# Patient Record
Sex: FEMALE | Race: WHITE | NOT HISPANIC OR LATINO
[De-identification: names, ages, dates, MRNs, and addresses within clinical notes are randomized per-mention and may not be internally consistent; named-entity substitution may affect disease eponyms.]

---

## 2020-05-20 ENCOUNTER — APPOINTMENT (OUTPATIENT)
Dept: OBGYN | Facility: CLINIC | Age: 35
End: 2020-05-20
Payer: MEDICAID

## 2020-05-20 VITALS — DIASTOLIC BLOOD PRESSURE: 80 MMHG | SYSTOLIC BLOOD PRESSURE: 120 MMHG | WEIGHT: 263 LBS

## 2020-05-20 DIAGNOSIS — Z82.79 FAMILY HISTORY OF OTHER CONGENITAL MALFORMATIONS, DEFORMATIONS AND CHROMOSOMAL ABNORMALITIES: ICD-10-CM

## 2020-05-20 DIAGNOSIS — Z91.09 OTHER ALLERGY STATUS, OTHER THAN TO DRUGS AND BIOLOGICAL SUBSTANCES: ICD-10-CM

## 2020-05-20 PROBLEM — Z00.00 ENCOUNTER FOR PREVENTIVE HEALTH EXAMINATION: Status: ACTIVE | Noted: 2020-05-20

## 2020-05-20 PROCEDURE — 36415 COLL VENOUS BLD VENIPUNCTURE: CPT

## 2020-05-20 PROCEDURE — 99203 OFFICE O/P NEW LOW 30 MIN: CPT | Mod: TH

## 2020-05-20 RX ORDER — ALBUTEROL SULFATE
POWDER (GRAM) MISCELLANEOUS
Refills: 0 | Status: ACTIVE | COMMUNITY

## 2020-05-20 NOTE — CHIEF COMPLAINT
[Initial Prenatal Visit] : initial prenatal visit [FreeTextEntry1] : Initial prenatal visit.\par LMP:11/8/2019\par SHEEBA;8/15/2020\par 27w5d

## 2020-05-20 NOTE — HISTORY OF PRESENT ILLNESS
[Definite:  ___ (Date)] : the last menstrual period was [unfilled] [Normal Amount/Duration] : was of a normal amount and duration [Frequency: Q ___ days] : menstrual periods occur approximately every [unfilled] days [Regular Cycle Intervals] : periods have been regular [Menarche Age: ____] : age at menarche was [unfilled] [Good] : being in good health [Healthy Diet] : a healthy diet [6 Months Ago] : 6 months ago [Regular Exercise] : regular exercise [Last Pap ___] : Last cervical pap smear was [unfilled] [Sexually Active] : is sexually active [Monogamous] : is monogamous [Male ___] : [unfilled] male [Yes] : yes [Weight Concerns] : no concerns with her weight [Spotting Between  Menses] : no spotting between menses [Menstrual Cramps] : no menstrual cramps [On BCP at conception] : the patient was not on BCP at conception [Contraception] : does not use contraception

## 2020-05-21 LAB
ABO + RH PNL BLD: NORMAL
BASOPHILS # BLD AUTO: 0.04 K/UL
BASOPHILS NFR BLD AUTO: 0.4 %
BLD GP AB SCN SERPL QL: NORMAL
C TRACH RRNA SPEC QL NAA+PROBE: NOT DETECTED
CMV IGG SERPL QL: <0.2 U/ML
CMV IGG SERPL-IMP: NEGATIVE
CMV IGM SERPL QL: <8 AU/ML
CMV IGM SERPL QL: NEGATIVE
EOSINOPHIL # BLD AUTO: 0.28 K/UL
EOSINOPHIL NFR BLD AUTO: 2.7 %
GLUCOSE 1H P 50 G GLC PO SERPL-MCNC: 108 MG/DL
HBV SURFACE AG SER QL: NONREACTIVE
HCT VFR BLD CALC: 38.4 %
HCV AB SER QL: NONREACTIVE
HCV S/CO RATIO: 0.13 S/CO
HGB BLD-MCNC: 11.7 G/DL
HIV1+2 AB SPEC QL IA.RAPID: NONREACTIVE
HSV 1+2 IGG SER IA-IMP: POSITIVE
HSV 1+2 IGG SER IA-IMP: POSITIVE
HSV1 IGG SER QL: 29.7 INDEX
HSV2 IGG SER QL: 2.75 INDEX
IMM GRANULOCYTES NFR BLD AUTO: 0.7 %
LYMPHOCYTES # BLD AUTO: 1.33 K/UL
LYMPHOCYTES NFR BLD AUTO: 12.6 %
MAN DIFF?: NORMAL
MCHC RBC-ENTMCNC: 30.5 GM/DL
MCHC RBC-ENTMCNC: 31.4 PG
MCV RBC AUTO: 102.9 FL
MEV IGG FLD QL IA: 186 AU/ML
MEV IGG+IGM SER-IMP: POSITIVE
MONOCYTES # BLD AUTO: 0.81 K/UL
MONOCYTES NFR BLD AUTO: 7.7 %
N GONORRHOEA RRNA SPEC QL NAA+PROBE: NOT DETECTED
NEUTROPHILS # BLD AUTO: 8 K/UL
NEUTROPHILS NFR BLD AUTO: 75.9 %
PLATELET # BLD AUTO: 156 K/UL
RBC # BLD: 3.73 M/UL
RBC # FLD: 13.9 %
RUBV IGG FLD-ACNC: 8.6 INDEX
RUBV IGG SER-IMP: POSITIVE
SOURCE AMPLIFICATION: NORMAL
T GONDII AB SER-IMP: NEGATIVE
T GONDII AB SER-IMP: NEGATIVE
T GONDII IGG SER QL: <3 IU/ML
T GONDII IGM SER QL: <3 AU/ML
T PALLIDUM AB SER QL IA: NEGATIVE
TSH SERPL-ACNC: 3.58 UIU/ML
VZV AB TITR SER: POSITIVE
VZV IGG SER IF-ACNC: 1187 INDEX
WBC # FLD AUTO: 10.53 K/UL

## 2020-05-21 RX ORDER — HUMAN RHO(D) IMMUNE GLOBULIN 300 UG/1
1500 INJECTION, SOLUTION INTRAMUSCULAR
Qty: 1 | Refills: 0 | Status: ACTIVE | COMMUNITY
Start: 2020-05-21 | End: 1900-01-01

## 2020-05-22 ENCOUNTER — APPOINTMENT (OUTPATIENT)
Dept: ANTEPARTUM | Facility: CLINIC | Age: 35
End: 2020-05-22
Payer: MEDICAID

## 2020-05-22 LAB
B19V IGG SER QL IA: 0.1 INDEX
B19V IGG+IGM SER-IMP: NEGATIVE
B19V IGG+IGM SER-IMP: NORMAL
B19V IGM FLD-ACNC: 0.3
B19V IGM SER-ACNC: NEGATIVE
BACTERIA UR CULT: NORMAL
HSV1 IGM SER QL: NORMAL TITER
HSV2 AB FLD-ACNC: NORMAL TITER

## 2020-05-22 PROCEDURE — 76811 OB US DETAILED SNGL FETUS: CPT

## 2020-05-22 PROCEDURE — 76817 TRANSVAGINAL US OBSTETRIC: CPT

## 2020-05-28 LAB
FMR1 GENE MUT ANL BLD/T: NORMAL
HGB A MFR BLD: 97.7 %
HGB A2 MFR BLD: 2.3 %
HGB FRACT BLD-IMP: NORMAL

## 2020-05-29 LAB
AR GENE MUT ANL BLD/T: NEGATIVE
CFTR MUT TESTED BLD/T: NEGATIVE

## 2020-06-03 ENCOUNTER — APPOINTMENT (OUTPATIENT)
Dept: OBGYN | Facility: CLINIC | Age: 35
End: 2020-06-03
Payer: MEDICAID

## 2020-06-03 VITALS
HEIGHT: 68 IN | DIASTOLIC BLOOD PRESSURE: 78 MMHG | BODY MASS INDEX: 40.92 KG/M2 | SYSTOLIC BLOOD PRESSURE: 124 MMHG | WEIGHT: 270 LBS

## 2020-06-03 PROCEDURE — 87081 CULTURE SCREEN ONLY: CPT

## 2020-06-03 PROCEDURE — 99213 OFFICE O/P EST LOW 20 MIN: CPT | Mod: TH

## 2020-06-09 ENCOUNTER — APPOINTMENT (OUTPATIENT)
Dept: ANTEPARTUM | Facility: CLINIC | Age: 35
End: 2020-06-09
Payer: MEDICAID

## 2020-06-09 PROCEDURE — 76815 OB US LIMITED FETUS(S): CPT

## 2020-06-09 PROCEDURE — 76819 FETAL BIOPHYS PROFIL W/O NST: CPT

## 2020-06-16 ENCOUNTER — LABORATORY RESULT (OUTPATIENT)
Age: 35
End: 2020-06-16

## 2020-06-16 ENCOUNTER — APPOINTMENT (OUTPATIENT)
Dept: OBGYN | Facility: CLINIC | Age: 35
End: 2020-06-16
Payer: MEDICAID

## 2020-06-16 VITALS
WEIGHT: 273 LBS | BODY MASS INDEX: 41.37 KG/M2 | SYSTOLIC BLOOD PRESSURE: 117 MMHG | DIASTOLIC BLOOD PRESSURE: 75 MMHG | HEIGHT: 68 IN

## 2020-06-16 DIAGNOSIS — Z67.91 OTHER SPECIFIED PREGNANCY RELATED CONDITIONS, UNSPECIFIED TRIMESTER: ICD-10-CM

## 2020-06-16 DIAGNOSIS — O26.899 OTHER SPECIFIED PREGNANCY RELATED CONDITIONS, UNSPECIFIED TRIMESTER: ICD-10-CM

## 2020-06-16 PROCEDURE — 99213 OFFICE O/P EST LOW 20 MIN: CPT | Mod: TH

## 2020-06-16 PROCEDURE — 36415 COLL VENOUS BLD VENIPUNCTURE: CPT

## 2020-06-17 LAB
ALBUMIN SERPL ELPH-MCNC: 3.7 G/DL
ALP BLD-CCNC: 88 U/L
ALT SERPL-CCNC: 11 U/L
ANION GAP SERPL CALC-SCNC: 14 MMOL/L
APPEARANCE: ABNORMAL
APTT BLD: 29 SEC
AST SERPL-CCNC: 16 U/L
BASOPHILS # BLD AUTO: 0.05 K/UL
BASOPHILS NFR BLD AUTO: 0.5 %
BILIRUB SERPL-MCNC: <0.2 MG/DL
BILIRUBIN URINE: NEGATIVE
BLOOD URINE: NEGATIVE
BUN SERPL-MCNC: 7 MG/DL
CALCIUM SERPL-MCNC: 8.9 MG/DL
CHLORIDE SERPL-SCNC: 107 MMOL/L
CO2 SERPL-SCNC: 18 MMOL/L
COLOR: YELLOW
CREAT SERPL-MCNC: 0.47 MG/DL
EOSINOPHIL # BLD AUTO: 0.31 K/UL
EOSINOPHIL NFR BLD AUTO: 2.8 %
GLUCOSE QUALITATIVE U: NEGATIVE
GLUCOSE SERPL-MCNC: 98 MG/DL
HCT VFR BLD CALC: 36.1 %
HGB BLD-MCNC: 11.7 G/DL
IMM GRANULOCYTES NFR BLD AUTO: 0.7 %
KETONES URINE: NEGATIVE
LEUKOCYTE ESTERASE URINE: ABNORMAL
LYMPHOCYTES # BLD AUTO: 1.4 K/UL
LYMPHOCYTES NFR BLD AUTO: 12.6 %
MAN DIFF?: NORMAL
MCHC RBC-ENTMCNC: 32.4 GM/DL
MCHC RBC-ENTMCNC: 32.4 PG
MCV RBC AUTO: 100 FL
MONOCYTES # BLD AUTO: 0.79 K/UL
MONOCYTES NFR BLD AUTO: 7.1 %
NEUTROPHILS # BLD AUTO: 8.47 K/UL
NEUTROPHILS NFR BLD AUTO: 76.3 %
NITRITE URINE: NEGATIVE
PH URINE: 6.5
PLATELET # BLD AUTO: 169 K/UL
POTASSIUM SERPL-SCNC: 3.9 MMOL/L
PROT SERPL-MCNC: 6.1 G/DL
PROTEIN URINE: ABNORMAL
RBC # BLD: 3.61 M/UL
RBC # FLD: 14.1 %
SODIUM SERPL-SCNC: 139 MMOL/L
SPECIFIC GRAVITY URINE: 1.02
URATE SERPL-MCNC: 3.1 MG/DL
UROBILINOGEN URINE: NORMAL
WBC # FLD AUTO: 11.1 K/UL

## 2020-06-26 LAB
CREAT 24H UR-MCNC: 1.2 G/24 H
CREAT 24H UR-MCNC: 1.2 G/24 H
CREAT ?TM UR-MCNC: 83 MG/DL
CREAT ?TM UR-MCNC: 83 MG/DL
PROT 24H UR-MRATE: 23 MG/DL
PROT ?TM UR-MCNC: 24 HR
PROT ?TM UR-MCNC: 24 HR
PROT UR-MCNC: 345 MG/24 H
SPECIMEN VOL 24H UR: 1500 ML
SPECIMEN VOL 24H UR: 1500 ML

## 2020-06-30 ENCOUNTER — LABORATORY RESULT (OUTPATIENT)
Age: 35
End: 2020-06-30

## 2020-06-30 ENCOUNTER — APPOINTMENT (OUTPATIENT)
Dept: OBGYN | Facility: CLINIC | Age: 35
End: 2020-06-30
Payer: MEDICAID

## 2020-06-30 ENCOUNTER — APPOINTMENT (OUTPATIENT)
Dept: ANTEPARTUM | Facility: CLINIC | Age: 35
End: 2020-06-30
Payer: MEDICAID

## 2020-06-30 VITALS
SYSTOLIC BLOOD PRESSURE: 120 MMHG | BODY MASS INDEX: 41.08 KG/M2 | DIASTOLIC BLOOD PRESSURE: 75 MMHG | WEIGHT: 271.06 LBS | HEIGHT: 68 IN

## 2020-06-30 DIAGNOSIS — Z34.90 ENCOUNTER FOR SUPERVISION OF NORMAL PREGNANCY, UNSPECIFIED, UNSPECIFIED TRIMESTER: ICD-10-CM

## 2020-06-30 PROCEDURE — 36415 COLL VENOUS BLD VENIPUNCTURE: CPT

## 2020-06-30 PROCEDURE — 99213 OFFICE O/P EST LOW 20 MIN: CPT | Mod: TH

## 2020-06-30 PROCEDURE — 76819 FETAL BIOPHYS PROFIL W/O NST: CPT

## 2020-06-30 PROCEDURE — 76816 OB US FOLLOW-UP PER FETUS: CPT

## 2020-07-06 LAB
ALBUMIN SERPL ELPH-MCNC: 4 G/DL
ALP BLD-CCNC: 106 U/L
ALT SERPL-CCNC: 9 U/L
ANION GAP SERPL CALC-SCNC: 12 MMOL/L
APPEARANCE: ABNORMAL
APTT BLD: 28 SEC
APTT IMM NP/PRE NP PPP: NORMAL
APTT INV RATIO PPP: 28 SEC
AST SERPL-CCNC: 11 U/L
BASOPHILS # BLD AUTO: 0.04 K/UL
BASOPHILS NFR BLD AUTO: 0.3 %
BILIRUB SERPL-MCNC: 0.2 MG/DL
BILIRUBIN URINE: NEGATIVE
BLOOD URINE: NEGATIVE
BUN SERPL-MCNC: 7 MG/DL
CALCIUM SERPL-MCNC: 9.4 MG/DL
CHLORIDE SERPL-SCNC: 108 MMOL/L
CO2 SERPL-SCNC: 21 MMOL/L
COLOR: YELLOW
CREAT SERPL-MCNC: 0.48 MG/DL
EOSINOPHIL # BLD AUTO: 0.21 K/UL
EOSINOPHIL NFR BLD AUTO: 1.8 %
GLUCOSE QUALITATIVE U: NEGATIVE
GLUCOSE SERPL-MCNC: 96 MG/DL
GP B STREP DNA SPEC QL NAA+PROBE: NORMAL
GP B STREP DNA SPEC QL NAA+PROBE: NOT DETECTED
HCT VFR BLD CALC: 38.2 %
HGB BLD-MCNC: 12 G/DL
IMM GRANULOCYTES NFR BLD AUTO: 0.8 %
KETONES URINE: NEGATIVE
LEUKOCYTE ESTERASE URINE: NEGATIVE
LYMPHOCYTES # BLD AUTO: 1.52 K/UL
LYMPHOCYTES NFR BLD AUTO: 13.1 %
MAN DIFF?: NORMAL
MCHC RBC-ENTMCNC: 31.4 GM/DL
MCHC RBC-ENTMCNC: 32.2 PG
MCV RBC AUTO: 102.4 FL
MONOCYTES # BLD AUTO: 0.83 K/UL
MONOCYTES NFR BLD AUTO: 7.1 %
NEUTROPHILS # BLD AUTO: 8.93 K/UL
NEUTROPHILS NFR BLD AUTO: 76.9 %
NITRITE URINE: NEGATIVE
NPP NORMAL POOLED PLASMA: NORMAL SECS
PH URINE: 6
PLATELET # BLD AUTO: 140 K/UL
POTASSIUM SERPL-SCNC: 4 MMOL/L
PROT SERPL-MCNC: 6 G/DL
PROTEIN URINE: ABNORMAL
RBC # BLD: 3.73 M/UL
RBC # FLD: 14.2 %
SODIUM SERPL-SCNC: 141 MMOL/L
SOURCE GBS: NORMAL
SPECIFIC GRAVITY URINE: 1.03
URATE SERPL-MCNC: 2.9 MG/DL
UROBILINOGEN URINE: NORMAL
WBC # FLD AUTO: 11.62 K/UL

## 2020-07-08 ENCOUNTER — APPOINTMENT (OUTPATIENT)
Dept: ANTEPARTUM | Facility: CLINIC | Age: 35
End: 2020-07-08
Payer: MEDICAID

## 2020-07-08 PROCEDURE — 76818 FETAL BIOPHYS PROFILE W/NST: CPT

## 2020-07-08 PROCEDURE — 76816 OB US FOLLOW-UP PER FETUS: CPT

## 2020-07-14 ENCOUNTER — APPOINTMENT (OUTPATIENT)
Dept: OBGYN | Facility: CLINIC | Age: 35
End: 2020-07-14
Payer: MEDICAID

## 2020-07-14 VITALS — WEIGHT: 272 LBS | BODY MASS INDEX: 41.22 KG/M2 | HEIGHT: 68 IN

## 2020-07-14 PROCEDURE — 99213 OFFICE O/P EST LOW 20 MIN: CPT | Mod: TH

## 2020-07-14 RX ORDER — VALACYCLOVIR 500 MG/1
500 TABLET, FILM COATED ORAL
Qty: 30 | Refills: 1 | Status: ACTIVE | COMMUNITY
Start: 2020-07-14 | End: 1900-01-01

## 2020-07-15 ENCOUNTER — APPOINTMENT (OUTPATIENT)
Dept: ANTEPARTUM | Facility: CLINIC | Age: 35
End: 2020-07-15
Payer: MEDICAID

## 2020-07-15 PROCEDURE — 76818 FETAL BIOPHYS PROFILE W/NST: CPT

## 2020-07-15 PROCEDURE — 76816 OB US FOLLOW-UP PER FETUS: CPT

## 2020-07-21 ENCOUNTER — APPOINTMENT (OUTPATIENT)
Dept: ANTEPARTUM | Facility: CLINIC | Age: 35
End: 2020-07-21
Payer: MEDICAID

## 2020-07-21 ENCOUNTER — NON-APPOINTMENT (OUTPATIENT)
Age: 35
End: 2020-07-21

## 2020-07-21 ENCOUNTER — APPOINTMENT (OUTPATIENT)
Dept: OBGYN | Facility: CLINIC | Age: 35
End: 2020-07-21
Payer: MEDICAID

## 2020-07-21 VITALS
BODY MASS INDEX: 42.55 KG/M2 | WEIGHT: 280.75 LBS | HEIGHT: 68 IN | SYSTOLIC BLOOD PRESSURE: 130 MMHG | DIASTOLIC BLOOD PRESSURE: 60 MMHG

## 2020-07-21 PROCEDURE — 76818 FETAL BIOPHYS PROFILE W/NST: CPT

## 2020-07-21 PROCEDURE — 99213 OFFICE O/P EST LOW 20 MIN: CPT | Mod: TH

## 2020-07-21 PROCEDURE — 76816 OB US FOLLOW-UP PER FETUS: CPT

## 2020-07-24 ENCOUNTER — APPOINTMENT (OUTPATIENT)
Dept: OBGYN | Facility: CLINIC | Age: 35
End: 2020-07-24
Payer: MEDICAID

## 2020-07-24 ENCOUNTER — APPOINTMENT (OUTPATIENT)
Dept: ANTEPARTUM | Facility: CLINIC | Age: 35
End: 2020-07-24
Payer: MEDICAID

## 2020-07-24 DIAGNOSIS — O14.93 UNSPECIFIED PRE-ECLAMPSIA, THIRD TRIMESTER: ICD-10-CM

## 2020-07-24 DIAGNOSIS — O09.519 SUPERVISION OF ELDERLY PRIMIGRAVIDA, UNSPECIFIED TRIMESTER: ICD-10-CM

## 2020-07-24 PROCEDURE — 76819 FETAL BIOPHYS PROFIL W/O NST: CPT

## 2020-07-24 PROCEDURE — 59025 FETAL NON-STRESS TEST: CPT | Mod: 59

## 2020-07-24 PROCEDURE — 99213 OFFICE O/P EST LOW 20 MIN: CPT | Mod: TH,25

## 2020-07-25 ENCOUNTER — LABORATORY RESULT (OUTPATIENT)
Age: 35
End: 2020-07-25

## 2020-07-27 ENCOUNTER — INPATIENT (INPATIENT)
Facility: HOSPITAL | Age: 35
LOS: 3 days | Discharge: HOME | End: 2020-07-31
Attending: OBSTETRICS & GYNECOLOGY | Admitting: OBSTETRICS & GYNECOLOGY
Payer: MEDICAID

## 2020-07-27 VITALS — WEIGHT: 273.37 LBS | HEIGHT: 69 IN

## 2020-07-27 DIAGNOSIS — O26.899 OTHER SPECIFIED PREGNANCY RELATED CONDITIONS, UNSPECIFIED TRIMESTER: ICD-10-CM

## 2020-07-27 DIAGNOSIS — Z3A.00 WEEKS OF GESTATION OF PREGNANCY NOT SPECIFIED: ICD-10-CM

## 2020-07-27 LAB
ALBUMIN SERPL ELPH-MCNC: 3.5 G/DL — SIGNIFICANT CHANGE UP (ref 3.3–5)
ALP SERPL-CCNC: 131 U/L — HIGH (ref 40–120)
ALT FLD-CCNC: 7 U/L — LOW (ref 10–45)
ANION GAP SERPL CALC-SCNC: 12 MMOL/L — SIGNIFICANT CHANGE UP (ref 5–17)
APPEARANCE UR: CLEAR — SIGNIFICANT CHANGE UP
APTT BLD: 28 SEC — SIGNIFICANT CHANGE UP (ref 27.5–35.5)
AST SERPL-CCNC: 20 U/L — SIGNIFICANT CHANGE UP (ref 10–40)
BASOPHILS # BLD AUTO: 0.03 K/UL — SIGNIFICANT CHANGE UP (ref 0–0.2)
BASOPHILS NFR BLD AUTO: 0.2 % — SIGNIFICANT CHANGE UP (ref 0–2)
BILIRUB SERPL-MCNC: 0.2 MG/DL — SIGNIFICANT CHANGE UP (ref 0.2–1.2)
BILIRUB UR-MCNC: NEGATIVE — SIGNIFICANT CHANGE UP
BLD GP AB SCN SERPL QL: POSITIVE — SIGNIFICANT CHANGE UP
BUN SERPL-MCNC: 10 MG/DL — SIGNIFICANT CHANGE UP (ref 7–23)
CALCIUM SERPL-MCNC: 9.2 MG/DL — SIGNIFICANT CHANGE UP (ref 8.4–10.5)
CHLORIDE SERPL-SCNC: 102 MMOL/L — SIGNIFICANT CHANGE UP (ref 96–108)
CO2 SERPL-SCNC: 23 MMOL/L — SIGNIFICANT CHANGE UP (ref 22–31)
COLOR SPEC: YELLOW — SIGNIFICANT CHANGE UP
CREAT ?TM UR-MCNC: 215 MG/DL — SIGNIFICANT CHANGE UP
CREAT SERPL-MCNC: 0.48 MG/DL — LOW (ref 0.5–1.3)
DIFF PNL FLD: NEGATIVE — SIGNIFICANT CHANGE UP
EOSINOPHIL # BLD AUTO: 0.33 K/UL — SIGNIFICANT CHANGE UP (ref 0–0.5)
EOSINOPHIL NFR BLD AUTO: 2.3 % — SIGNIFICANT CHANGE UP (ref 0–6)
FIBRINOGEN PPP-MCNC: 587 MG/DL — HIGH (ref 258–438)
GLUCOSE SERPL-MCNC: 119 MG/DL — HIGH (ref 70–99)
GLUCOSE UR QL: NEGATIVE — SIGNIFICANT CHANGE UP
HCT VFR BLD CALC: 35.3 % — SIGNIFICANT CHANGE UP (ref 34.5–45)
HGB BLD-MCNC: 11.8 G/DL — SIGNIFICANT CHANGE UP (ref 11.5–15.5)
IMM GRANULOCYTES NFR BLD AUTO: 0.8 % — SIGNIFICANT CHANGE UP (ref 0–1.5)
INR BLD: 0.9 — SIGNIFICANT CHANGE UP (ref 0.88–1.16)
KETONES UR-MCNC: ABNORMAL MG/DL
LDH SERPL L TO P-CCNC: 174 U/L — SIGNIFICANT CHANGE UP (ref 50–242)
LEUKOCYTE ESTERASE UR-ACNC: NEGATIVE — SIGNIFICANT CHANGE UP
LYMPHOCYTES # BLD AUTO: 1.84 K/UL — SIGNIFICANT CHANGE UP (ref 1–3.3)
LYMPHOCYTES # BLD AUTO: 12.8 % — LOW (ref 13–44)
MCHC RBC-ENTMCNC: 32 PG — SIGNIFICANT CHANGE UP (ref 27–34)
MCHC RBC-ENTMCNC: 33.4 GM/DL — SIGNIFICANT CHANGE UP (ref 32–36)
MCV RBC AUTO: 95.7 FL — SIGNIFICANT CHANGE UP (ref 80–100)
MONOCYTES # BLD AUTO: 1.11 K/UL — HIGH (ref 0–0.9)
MONOCYTES NFR BLD AUTO: 7.7 % — SIGNIFICANT CHANGE UP (ref 2–14)
NEUTROPHILS # BLD AUTO: 10.92 K/UL — HIGH (ref 1.8–7.4)
NEUTROPHILS NFR BLD AUTO: 76.2 % — SIGNIFICANT CHANGE UP (ref 43–77)
NITRITE UR-MCNC: NEGATIVE — SIGNIFICANT CHANGE UP
NRBC # BLD: 0 /100 WBCS — SIGNIFICANT CHANGE UP (ref 0–0)
PH UR: 6 — SIGNIFICANT CHANGE UP (ref 5–8)
PLATELET # BLD AUTO: 169 K/UL — SIGNIFICANT CHANGE UP (ref 150–400)
POTASSIUM SERPL-MCNC: 3.6 MMOL/L — SIGNIFICANT CHANGE UP (ref 3.5–5.3)
POTASSIUM SERPL-SCNC: 3.6 MMOL/L — SIGNIFICANT CHANGE UP (ref 3.5–5.3)
PROT ?TM UR-MCNC: 49 MG/DL — HIGH (ref 0–12)
PROT SERPL-MCNC: 6.6 G/DL — SIGNIFICANT CHANGE UP (ref 6–8.3)
PROT UR-MCNC: 30 MG/DL
PROT/CREAT UR-RTO: 0.2 RATIO — SIGNIFICANT CHANGE UP (ref 0–0.2)
PROTHROM AB SERPL-ACNC: 10.9 SEC — SIGNIFICANT CHANGE UP (ref 10.6–13.6)
RBC # BLD: 3.69 M/UL — LOW (ref 3.8–5.2)
RBC # FLD: 14.3 % — SIGNIFICANT CHANGE UP (ref 10.3–14.5)
RH IG SCN BLD-IMP: NEGATIVE — SIGNIFICANT CHANGE UP
RH IG SCN BLD-IMP: NEGATIVE — SIGNIFICANT CHANGE UP
SODIUM SERPL-SCNC: 137 MMOL/L — SIGNIFICANT CHANGE UP (ref 135–145)
SP GR SPEC: >=1.03 — SIGNIFICANT CHANGE UP (ref 1–1.03)
URATE SERPL-MCNC: 3.8 MG/DL — SIGNIFICANT CHANGE UP (ref 2.5–7)
UROBILINOGEN FLD QL: 1 E.U./DL — SIGNIFICANT CHANGE UP
WBC # BLD: 14.34 K/UL — HIGH (ref 3.8–10.5)
WBC # FLD AUTO: 14.34 K/UL — HIGH (ref 3.8–10.5)

## 2020-07-27 RX ORDER — OXYTOCIN 10 UNIT/ML
333.33 VIAL (ML) INJECTION
Qty: 20 | Refills: 0 | Status: DISCONTINUED | OUTPATIENT
Start: 2020-07-27 | End: 2020-07-29

## 2020-07-27 RX ORDER — DINOPROSTONE 10 MG/241MG
10 INSERT VAGINAL ONCE
Refills: 0 | Status: DISCONTINUED | OUTPATIENT
Start: 2020-07-27 | End: 2020-07-29

## 2020-07-27 RX ORDER — SODIUM CHLORIDE 9 MG/ML
1000 INJECTION, SOLUTION INTRAVENOUS
Refills: 0 | Status: DISCONTINUED | OUTPATIENT
Start: 2020-07-27 | End: 2020-07-28

## 2020-07-27 RX ORDER — CITRIC ACID/SODIUM CITRATE 300-500 MG
15 SOLUTION, ORAL ORAL EVERY 6 HOURS
Refills: 0 | Status: DISCONTINUED | OUTPATIENT
Start: 2020-07-27 | End: 2020-07-29

## 2020-07-27 RX ADMIN — SODIUM CHLORIDE 125 MILLILITER(S): 9 INJECTION, SOLUTION INTRAVENOUS at 22:44

## 2020-07-28 ENCOUNTER — TRANSCRIPTION ENCOUNTER (OUTPATIENT)
Age: 35
End: 2020-07-28

## 2020-07-28 LAB
SARS-COV-2 IGG SERPL QL IA: NEGATIVE — SIGNIFICANT CHANGE UP
SARS-COV-2 IGM SERPL IA-ACNC: 0.08 INDEX — SIGNIFICANT CHANGE UP
T PALLIDUM AB TITR SER: NEGATIVE — SIGNIFICANT CHANGE UP

## 2020-07-28 RX ORDER — DINOPROSTONE 10 MG/241MG
10 INSERT VAGINAL ONCE
Refills: 0 | Status: COMPLETED | OUTPATIENT
Start: 2020-07-28 | End: 2020-07-28

## 2020-07-28 RX ORDER — ACETAMINOPHEN 500 MG
650 TABLET ORAL ONCE
Refills: 0 | Status: COMPLETED | OUTPATIENT
Start: 2020-07-28 | End: 2020-07-28

## 2020-07-28 RX ORDER — OXYTOCIN 10 UNIT/ML
1 VIAL (ML) INJECTION
Qty: 30 | Refills: 0 | Status: DISCONTINUED | OUTPATIENT
Start: 2020-07-28 | End: 2020-07-29

## 2020-07-28 RX ORDER — SODIUM CHLORIDE 9 MG/ML
1000 INJECTION, SOLUTION INTRAVENOUS
Refills: 0 | Status: DISCONTINUED | OUTPATIENT
Start: 2020-07-28 | End: 2020-07-29

## 2020-07-28 RX ADMIN — Medication 650 MILLIGRAM(S): at 10:32

## 2020-07-28 RX ADMIN — Medication 650 MILLIGRAM(S): at 07:26

## 2020-07-28 RX ADMIN — Medication 650 MILLIGRAM(S): at 23:25

## 2020-07-28 RX ADMIN — Medication 1 MILLIUNIT(S)/MIN: at 11:01

## 2020-07-28 RX ADMIN — Medication 650 MILLIGRAM(S): at 22:52

## 2020-07-28 RX ADMIN — SODIUM CHLORIDE 125 MILLILITER(S): 9 INJECTION, SOLUTION INTRAVENOUS at 13:43

## 2020-07-28 RX ADMIN — DINOPROSTONE 10 MILLIGRAM(S): 10 INSERT VAGINAL at 14:30

## 2020-07-29 ENCOUNTER — RESULT REVIEW (OUTPATIENT)
Age: 35
End: 2020-07-29

## 2020-07-29 LAB
BLD GP AB SCN SERPL QL: POSITIVE — SIGNIFICANT CHANGE UP
RH IG SCN BLD-IMP: NEGATIVE — SIGNIFICANT CHANGE UP

## 2020-07-29 PROCEDURE — 59514 CESAREAN DELIVERY ONLY: CPT | Mod: U9

## 2020-07-29 PROCEDURE — 88307 TISSUE EXAM BY PATHOLOGIST: CPT | Mod: 26

## 2020-07-29 RX ORDER — ENOXAPARIN SODIUM 100 MG/ML
40 INJECTION SUBCUTANEOUS EVERY 12 HOURS
Refills: 0 | Status: DISCONTINUED | OUTPATIENT
Start: 2020-07-30 | End: 2020-07-31

## 2020-07-29 RX ORDER — ACETAMINOPHEN 500 MG
650 TABLET ORAL ONCE
Refills: 0 | Status: COMPLETED | OUTPATIENT
Start: 2020-07-29 | End: 2020-07-29

## 2020-07-29 RX ORDER — LANOLIN
1 OINTMENT (GRAM) TOPICAL EVERY 6 HOURS
Refills: 0 | Status: DISCONTINUED | OUTPATIENT
Start: 2020-07-29 | End: 2020-07-31

## 2020-07-29 RX ORDER — DEXAMETHASONE 0.5 MG/5ML
4 ELIXIR ORAL EVERY 6 HOURS
Refills: 0 | Status: DISCONTINUED | OUTPATIENT
Start: 2020-07-29 | End: 2020-07-29

## 2020-07-29 RX ORDER — FENTANYL/BUPIVACAINE/NS/PF 2MCG/ML-.1
250 PLASTIC BAG, INJECTION (ML) INJECTION
Refills: 0 | Status: DISCONTINUED | OUTPATIENT
Start: 2020-07-29 | End: 2020-07-29

## 2020-07-29 RX ORDER — OXYCODONE HYDROCHLORIDE 5 MG/1
5 TABLET ORAL
Refills: 0 | Status: COMPLETED | OUTPATIENT
Start: 2020-07-29 | End: 2020-08-05

## 2020-07-29 RX ORDER — ALBUTEROL 90 UG/1
2 AEROSOL, METERED ORAL EVERY 6 HOURS
Refills: 0 | Status: DISCONTINUED | OUTPATIENT
Start: 2020-07-29 | End: 2020-07-31

## 2020-07-29 RX ORDER — METOPROLOL TARTRATE 50 MG
25 TABLET ORAL AT BEDTIME
Refills: 0 | Status: DISCONTINUED | OUTPATIENT
Start: 2020-07-29 | End: 2020-07-31

## 2020-07-29 RX ORDER — ACETAMINOPHEN 500 MG
975 TABLET ORAL
Refills: 0 | Status: DISCONTINUED | OUTPATIENT
Start: 2020-07-29 | End: 2020-07-31

## 2020-07-29 RX ORDER — DIPHENHYDRAMINE HCL 50 MG
25 CAPSULE ORAL EVERY 6 HOURS
Refills: 0 | Status: DISCONTINUED | OUTPATIENT
Start: 2020-07-29 | End: 2020-07-31

## 2020-07-29 RX ORDER — ONDANSETRON 8 MG/1
4 TABLET, FILM COATED ORAL EVERY 6 HOURS
Refills: 0 | Status: DISCONTINUED | OUTPATIENT
Start: 2020-07-29 | End: 2020-07-29

## 2020-07-29 RX ORDER — CHLORHEXIDINE GLUCONATE 213 G/1000ML
1 SOLUTION TOPICAL DAILY
Refills: 0 | Status: DISCONTINUED | OUTPATIENT
Start: 2020-07-29 | End: 2020-07-29

## 2020-07-29 RX ORDER — CEFAZOLIN SODIUM 1 G
2000 VIAL (EA) INJECTION ONCE
Refills: 0 | Status: COMPLETED | OUTPATIENT
Start: 2020-07-29 | End: 2020-07-29

## 2020-07-29 RX ORDER — IBUPROFEN 200 MG
600 TABLET ORAL EVERY 6 HOURS
Refills: 0 | Status: COMPLETED | OUTPATIENT
Start: 2020-07-29 | End: 2021-06-27

## 2020-07-29 RX ORDER — METOPROLOL TARTRATE 50 MG
25 TABLET ORAL AT BEDTIME
Refills: 0 | Status: DISCONTINUED | OUTPATIENT
Start: 2020-07-29 | End: 2020-07-29

## 2020-07-29 RX ORDER — CITRIC ACID/SODIUM CITRATE 300-500 MG
30 SOLUTION, ORAL ORAL ONCE
Refills: 0 | Status: COMPLETED | OUTPATIENT
Start: 2020-07-29 | End: 2020-07-29

## 2020-07-29 RX ORDER — OXYCODONE HYDROCHLORIDE 5 MG/1
5 TABLET ORAL ONCE
Refills: 0 | Status: DISCONTINUED | OUTPATIENT
Start: 2020-07-29 | End: 2020-07-31

## 2020-07-29 RX ORDER — SODIUM CHLORIDE 9 MG/ML
1000 INJECTION, SOLUTION INTRAVENOUS
Refills: 0 | Status: DISCONTINUED | OUTPATIENT
Start: 2020-07-29 | End: 2020-07-31

## 2020-07-29 RX ORDER — OXYTOCIN 10 UNIT/ML
333.33 VIAL (ML) INJECTION
Qty: 20 | Refills: 0 | Status: DISCONTINUED | OUTPATIENT
Start: 2020-07-29 | End: 2020-07-31

## 2020-07-29 RX ORDER — KETOROLAC TROMETHAMINE 30 MG/ML
30 SYRINGE (ML) INJECTION EVERY 6 HOURS
Refills: 0 | Status: DISCONTINUED | OUTPATIENT
Start: 2020-07-29 | End: 2020-07-30

## 2020-07-29 RX ORDER — TETANUS TOXOID, REDUCED DIPHTHERIA TOXOID AND ACELLULAR PERTUSSIS VACCINE, ADSORBED 5; 2.5; 8; 8; 2.5 [IU]/.5ML; [IU]/.5ML; UG/.5ML; UG/.5ML; UG/.5ML
0.5 SUSPENSION INTRAMUSCULAR ONCE
Refills: 0 | Status: COMPLETED | OUTPATIENT
Start: 2020-07-29

## 2020-07-29 RX ORDER — MAGNESIUM HYDROXIDE 400 MG/1
30 TABLET, CHEWABLE ORAL
Refills: 0 | Status: DISCONTINUED | OUTPATIENT
Start: 2020-07-29 | End: 2020-07-31

## 2020-07-29 RX ORDER — MORPHINE SULFATE 50 MG/1
3 CAPSULE, EXTENDED RELEASE ORAL ONCE
Refills: 0 | Status: DISCONTINUED | OUTPATIENT
Start: 2020-07-29 | End: 2020-07-29

## 2020-07-29 RX ORDER — SIMETHICONE 80 MG/1
80 TABLET, CHEWABLE ORAL EVERY 4 HOURS
Refills: 0 | Status: DISCONTINUED | OUTPATIENT
Start: 2020-07-29 | End: 2020-07-31

## 2020-07-29 RX ORDER — NALOXONE HYDROCHLORIDE 4 MG/.1ML
0.1 SPRAY NASAL
Refills: 0 | Status: DISCONTINUED | OUTPATIENT
Start: 2020-07-29 | End: 2020-07-29

## 2020-07-29 RX ADMIN — Medication 250 MILLILITER(S): at 05:32

## 2020-07-29 RX ADMIN — Medication 30 MILLIGRAM(S): at 21:00

## 2020-07-29 RX ADMIN — SIMETHICONE 80 MILLIGRAM(S): 80 TABLET, CHEWABLE ORAL at 22:41

## 2020-07-29 RX ADMIN — Medication 30 MILLILITER(S): at 16:50

## 2020-07-29 RX ADMIN — Medication 25 MILLIGRAM(S): at 22:41

## 2020-07-29 RX ADMIN — Medication 650 MILLIGRAM(S): at 15:35

## 2020-07-29 RX ADMIN — Medication 1000 MILLIUNIT(S)/MIN: at 18:43

## 2020-07-29 RX ADMIN — Medication 100 MILLIGRAM(S): at 16:50

## 2020-07-30 LAB
ALBUMIN SERPL ELPH-MCNC: 2.9 G/DL — LOW (ref 3.3–5)
ALP SERPL-CCNC: 96 U/L — SIGNIFICANT CHANGE UP (ref 40–120)
ALT FLD-CCNC: 6 U/L — LOW (ref 10–45)
ANION GAP SERPL CALC-SCNC: 10 MMOL/L — SIGNIFICANT CHANGE UP (ref 5–17)
AST SERPL-CCNC: 18 U/L — SIGNIFICANT CHANGE UP (ref 10–40)
BASOPHILS # BLD AUTO: 0.02 K/UL — SIGNIFICANT CHANGE UP (ref 0–0.2)
BASOPHILS NFR BLD AUTO: 0.2 % — SIGNIFICANT CHANGE UP (ref 0–2)
BILIRUB SERPL-MCNC: 0.3 MG/DL — SIGNIFICANT CHANGE UP (ref 0.2–1.2)
BUN SERPL-MCNC: 10 MG/DL — SIGNIFICANT CHANGE UP (ref 7–23)
CALCIUM SERPL-MCNC: 8.7 MG/DL — SIGNIFICANT CHANGE UP (ref 8.4–10.5)
CHLORIDE SERPL-SCNC: 103 MMOL/L — SIGNIFICANT CHANGE UP (ref 96–108)
CO2 SERPL-SCNC: 23 MMOL/L — SIGNIFICANT CHANGE UP (ref 22–31)
CREAT SERPL-MCNC: 0.5 MG/DL — SIGNIFICANT CHANGE UP (ref 0.5–1.3)
EOSINOPHIL # BLD AUTO: 0.04 K/UL — SIGNIFICANT CHANGE UP (ref 0–0.5)
EOSINOPHIL NFR BLD AUTO: 0.4 % — SIGNIFICANT CHANGE UP (ref 0–6)
GLUCOSE SERPL-MCNC: 120 MG/DL — HIGH (ref 70–99)
HCT VFR BLD CALC: 30.7 % — LOW (ref 34.5–45)
HGB BLD-MCNC: 10.2 G/DL — LOW (ref 11.5–15.5)
IMM GRANULOCYTES NFR BLD AUTO: 0.6 % — SIGNIFICANT CHANGE UP (ref 0–1.5)
LYMPHOCYTES # BLD AUTO: 1.13 K/UL — SIGNIFICANT CHANGE UP (ref 1–3.3)
LYMPHOCYTES # BLD AUTO: 10 % — LOW (ref 13–44)
MCHC RBC-ENTMCNC: 32.4 PG — SIGNIFICANT CHANGE UP (ref 27–34)
MCHC RBC-ENTMCNC: 33.2 GM/DL — SIGNIFICANT CHANGE UP (ref 32–36)
MCV RBC AUTO: 97.5 FL — SIGNIFICANT CHANGE UP (ref 80–100)
MONOCYTES # BLD AUTO: 0.78 K/UL — SIGNIFICANT CHANGE UP (ref 0–0.9)
MONOCYTES NFR BLD AUTO: 6.9 % — SIGNIFICANT CHANGE UP (ref 2–14)
NEUTROPHILS # BLD AUTO: 9.21 K/UL — HIGH (ref 1.8–7.4)
NEUTROPHILS NFR BLD AUTO: 81.9 % — HIGH (ref 43–77)
NRBC # BLD: 0 /100 WBCS — SIGNIFICANT CHANGE UP (ref 0–0)
PLATELET # BLD AUTO: 126 K/UL — LOW (ref 150–400)
POTASSIUM SERPL-MCNC: 3.6 MMOL/L — SIGNIFICANT CHANGE UP (ref 3.5–5.3)
POTASSIUM SERPL-SCNC: 3.6 MMOL/L — SIGNIFICANT CHANGE UP (ref 3.5–5.3)
PROT SERPL-MCNC: 5.1 G/DL — LOW (ref 6–8.3)
RBC # BLD: 3.15 M/UL — LOW (ref 3.8–5.2)
RBC # FLD: 14.4 % — SIGNIFICANT CHANGE UP (ref 10.3–14.5)
SODIUM SERPL-SCNC: 136 MMOL/L — SIGNIFICANT CHANGE UP (ref 135–145)
WBC # BLD: 11.25 K/UL — HIGH (ref 3.8–10.5)
WBC # FLD AUTO: 11.25 K/UL — HIGH (ref 3.8–10.5)

## 2020-07-30 RX ORDER — ONDANSETRON 8 MG/1
4 TABLET, FILM COATED ORAL ONCE
Refills: 0 | Status: COMPLETED | OUTPATIENT
Start: 2020-07-30 | End: 2020-07-30

## 2020-07-30 RX ORDER — ONDANSETRON 8 MG/1
4 TABLET, FILM COATED ORAL ONCE
Refills: 0 | Status: DISCONTINUED | OUTPATIENT
Start: 2020-07-30 | End: 2020-07-30

## 2020-07-30 RX ORDER — IBUPROFEN 200 MG
600 TABLET ORAL EVERY 6 HOURS
Refills: 0 | Status: DISCONTINUED | OUTPATIENT
Start: 2020-07-30 | End: 2020-07-31

## 2020-07-30 RX ADMIN — Medication 975 MILLIGRAM(S): at 15:00

## 2020-07-30 RX ADMIN — SODIUM CHLORIDE 125 MILLILITER(S): 9 INJECTION, SOLUTION INTRAVENOUS at 08:48

## 2020-07-30 RX ADMIN — SIMETHICONE 80 MILLIGRAM(S): 80 TABLET, CHEWABLE ORAL at 17:40

## 2020-07-30 RX ADMIN — ENOXAPARIN SODIUM 40 MILLIGRAM(S): 100 INJECTION SUBCUTANEOUS at 18:30

## 2020-07-30 RX ADMIN — Medication 30 MILLIGRAM(S): at 08:00

## 2020-07-30 RX ADMIN — Medication 30 MILLIGRAM(S): at 07:10

## 2020-07-30 RX ADMIN — ENOXAPARIN SODIUM 40 MILLIGRAM(S): 100 INJECTION SUBCUTANEOUS at 07:10

## 2020-07-30 RX ADMIN — Medication 975 MILLIGRAM(S): at 15:30

## 2020-07-30 RX ADMIN — SIMETHICONE 80 MILLIGRAM(S): 80 TABLET, CHEWABLE ORAL at 11:47

## 2020-07-30 RX ADMIN — Medication 975 MILLIGRAM(S): at 01:00

## 2020-07-30 RX ADMIN — Medication 30 MILLIGRAM(S): at 18:30

## 2020-07-30 RX ADMIN — ONDANSETRON 4 MILLIGRAM(S): 8 TABLET, FILM COATED ORAL at 01:38

## 2020-07-30 RX ADMIN — ALBUTEROL 2 PUFF(S): 90 AEROSOL, METERED ORAL at 07:00

## 2020-07-30 RX ADMIN — SIMETHICONE 80 MILLIGRAM(S): 80 TABLET, CHEWABLE ORAL at 07:13

## 2020-07-30 RX ADMIN — Medication 975 MILLIGRAM(S): at 00:19

## 2020-07-30 RX ADMIN — Medication 975 MILLIGRAM(S): at 21:45

## 2020-07-30 RX ADMIN — Medication 30 MILLIGRAM(S): at 18:00

## 2020-07-30 RX ADMIN — SIMETHICONE 80 MILLIGRAM(S): 80 TABLET, CHEWABLE ORAL at 21:45

## 2020-07-30 RX ADMIN — Medication 975 MILLIGRAM(S): at 09:30

## 2020-07-30 RX ADMIN — Medication 975 MILLIGRAM(S): at 09:00

## 2020-07-30 RX ADMIN — Medication 30 MILLIGRAM(S): at 12:00

## 2020-07-30 RX ADMIN — Medication 975 MILLIGRAM(S): at 22:40

## 2020-07-30 RX ADMIN — Medication 30 MILLIGRAM(S): at 12:30

## 2020-07-30 RX ADMIN — Medication 25 MILLIGRAM(S): at 21:46

## 2020-07-30 NOTE — PROGRESS NOTE ADULT - SUBJECTIVE AND OBJECTIVE BOX
OB Progress Note:  Delivery, POD#1    S: 35yo POD#1 s/p LTCS . Her pain is well controlled. She is tolerating a regular diet and passing flatus. Denies N/V. Denies CP/SOB/lightheadedness/dizziness.   She is ambulating without difficulty; denies heavy vaginal bleeding.   Miller catheter in place; overnight, lagging urine output/hour, 30-50ccs; however, 100cc in the past hour.     O:   Vital Signs Last 24 Hrs  T(C): 36.9 (2020 05:52), Max: 37.2 (2020 22:14)  T(F): 98.5 (2020 05:52), Max: 98.9 (2020 22:14)  HR: 88 (2020 05:52) (82 - 99)  BP: 127/72 (2020 05:52) (118/56 - 150/87)  BP(mean): 77 (2020 18:15) (77 - 77)  RR: 18 (2020 05:52) (15 - 20)  SpO2: 95% (2020 05:52) (95% - 97%)    Labs:  Blood type: A Negative  Rubella IgG: RPR: Negative                          10.2<L>   11.25<H> >-----------< 126<L>    (  @ 05:53 )             30.7<L>                        11.8   14.34<H> >-----------< 169    (  @ 22:23 )             35.3    20 @ 22:23      137  |  102  |  10  ----------------------------<  119<H>  3.6   |  23  |  0.48<L>        PE:  General: NAD  Abdomen: Mildly distended, appropriately tender, incision c/d/i.  Extremities: No erythema, no pitting edema

## 2020-07-30 NOTE — PROGRESS NOTE ADULT - PROBLEM SELECTOR PLAN 1
- Continue regular diet.  - Increase ambulation; lovenox bid and SCDs   - Continue motrin, tylenol, oxycodone PRN for pain control.    - F/u AM CMP to asses renal function   - Monitor UOP; once adequate, remove ailyn Franklin List PGY2

## 2020-07-31 ENCOUNTER — TRANSCRIPTION ENCOUNTER (OUTPATIENT)
Age: 35
End: 2020-07-31

## 2020-07-31 VITALS
HEART RATE: 84 BPM | OXYGEN SATURATION: 97 % | DIASTOLIC BLOOD PRESSURE: 92 MMHG | RESPIRATION RATE: 18 BRPM | SYSTOLIC BLOOD PRESSURE: 142 MMHG | TEMPERATURE: 98 F

## 2020-07-31 PROCEDURE — 86850 RBC ANTIBODY SCREEN: CPT

## 2020-07-31 PROCEDURE — 84550 ASSAY OF BLOOD/URIC ACID: CPT

## 2020-07-31 PROCEDURE — 90715 TDAP VACCINE 7 YRS/> IM: CPT

## 2020-07-31 PROCEDURE — 80053 COMPREHEN METABOLIC PANEL: CPT

## 2020-07-31 PROCEDURE — 99214 OFFICE O/P EST MOD 30 MIN: CPT

## 2020-07-31 PROCEDURE — 86769 SARS-COV-2 COVID-19 ANTIBODY: CPT

## 2020-07-31 PROCEDURE — 81001 URINALYSIS AUTO W/SCOPE: CPT

## 2020-07-31 PROCEDURE — 86901 BLOOD TYPING SEROLOGIC RH(D): CPT

## 2020-07-31 PROCEDURE — 94640 AIRWAY INHALATION TREATMENT: CPT

## 2020-07-31 PROCEDURE — 85610 PROTHROMBIN TIME: CPT

## 2020-07-31 PROCEDURE — 86780 TREPONEMA PALLIDUM: CPT

## 2020-07-31 PROCEDURE — 86880 COOMBS TEST DIRECT: CPT

## 2020-07-31 PROCEDURE — 36415 COLL VENOUS BLD VENIPUNCTURE: CPT

## 2020-07-31 PROCEDURE — 84156 ASSAY OF PROTEIN URINE: CPT

## 2020-07-31 PROCEDURE — 85025 COMPLETE CBC W/AUTO DIFF WBC: CPT

## 2020-07-31 PROCEDURE — 82570 ASSAY OF URINE CREATININE: CPT

## 2020-07-31 PROCEDURE — 85384 FIBRINOGEN ACTIVITY: CPT

## 2020-07-31 PROCEDURE — 86870 RBC ANTIBODY IDENTIFICATION: CPT

## 2020-07-31 PROCEDURE — 83615 LACTATE (LD) (LDH) ENZYME: CPT

## 2020-07-31 PROCEDURE — 88307 TISSUE EXAM BY PATHOLOGIST: CPT

## 2020-07-31 PROCEDURE — 85730 THROMBOPLASTIN TIME PARTIAL: CPT

## 2020-07-31 RX ORDER — ACETAMINOPHEN 500 MG
3 TABLET ORAL
Qty: 0 | Refills: 0 | DISCHARGE
Start: 2020-07-31

## 2020-07-31 RX ORDER — OXYCODONE HYDROCHLORIDE 5 MG/1
5 TABLET ORAL
Refills: 0 | Status: DISCONTINUED | OUTPATIENT
Start: 2020-07-31 | End: 2020-07-31

## 2020-07-31 RX ORDER — TETANUS TOXOID, REDUCED DIPHTHERIA TOXOID AND ACELLULAR PERTUSSIS VACCINE, ADSORBED 5; 2.5; 8; 8; 2.5 [IU]/.5ML; [IU]/.5ML; UG/.5ML; UG/.5ML; UG/.5ML
0.5 SUSPENSION INTRAMUSCULAR ONCE
Refills: 0 | Status: COMPLETED | OUTPATIENT
Start: 2020-07-31 | End: 2020-07-31

## 2020-07-31 RX ORDER — IBUPROFEN 200 MG
1 TABLET ORAL
Qty: 0 | Refills: 0 | DISCHARGE
Start: 2020-07-31

## 2020-07-31 RX ORDER — METOPROLOL TARTRATE 50 MG
1 TABLET ORAL
Qty: 0 | Refills: 0 | DISCHARGE
Start: 2020-07-31

## 2020-07-31 RX ADMIN — Medication 600 MILLIGRAM(S): at 13:30

## 2020-07-31 RX ADMIN — Medication 975 MILLIGRAM(S): at 03:56

## 2020-07-31 RX ADMIN — SIMETHICONE 80 MILLIGRAM(S): 80 TABLET, CHEWABLE ORAL at 03:56

## 2020-07-31 RX ADMIN — Medication 600 MILLIGRAM(S): at 00:40

## 2020-07-31 RX ADMIN — OXYCODONE HYDROCHLORIDE 5 MILLIGRAM(S): 5 TABLET ORAL at 01:56

## 2020-07-31 RX ADMIN — Medication 975 MILLIGRAM(S): at 05:26

## 2020-07-31 RX ADMIN — Medication 600 MILLIGRAM(S): at 01:40

## 2020-07-31 RX ADMIN — Medication 975 MILLIGRAM(S): at 10:45

## 2020-07-31 RX ADMIN — OXYCODONE HYDROCHLORIDE 5 MILLIGRAM(S): 5 TABLET ORAL at 02:50

## 2020-07-31 RX ADMIN — Medication 975 MILLIGRAM(S): at 16:18

## 2020-07-31 RX ADMIN — ENOXAPARIN SODIUM 40 MILLIGRAM(S): 100 INJECTION SUBCUTANEOUS at 05:53

## 2020-07-31 RX ADMIN — Medication 600 MILLIGRAM(S): at 05:51

## 2020-07-31 RX ADMIN — TETANUS TOXOID, REDUCED DIPHTHERIA TOXOID AND ACELLULAR PERTUSSIS VACCINE, ADSORBED 0.5 MILLILITER(S): 5; 2.5; 8; 8; 2.5 SUSPENSION INTRAMUSCULAR at 07:04

## 2020-07-31 RX ADMIN — Medication 600 MILLIGRAM(S): at 06:45

## 2020-07-31 RX ADMIN — Medication 600 MILLIGRAM(S): at 12:44

## 2020-07-31 RX ADMIN — Medication 975 MILLIGRAM(S): at 09:58

## 2020-07-31 RX ADMIN — Medication 975 MILLIGRAM(S): at 17:00

## 2020-07-31 NOTE — DISCHARGE NOTE OB - MEDICATION SUMMARY - MEDICATIONS TO TAKE
I will START or STAY ON the medications listed below when I get home from the hospital:    acetaminophen 325 mg oral tablet  -- 3 tab(s) by mouth   -- Indication: For Pain    ibuprofen 600 mg oral tablet  -- 1 tab(s) by mouth every 6 hours  -- Indication: For Pain    metoprolol succinate 25 mg oral tablet, extended release  -- 1 tab(s) by mouth once a day (at bedtime)  -- Indication: For Chronic hypertension

## 2020-07-31 NOTE — DISCHARGE NOTE OB - PLAN OF CARE
happy healthy mom and baby! 34 year old female s/p  section, post operative day 2, meeting all postpartum milestones. No heavy lifting. Pelvic rest until cleared. Take Motrin 600mg every 6 hours and/or tylenol 650mg every 6 hours as needed for pain. Call your Doctor  to schedule a follow up appointment in 1 week. Call your Doctor if you experience severe abdominal pain not improved by oral pain medications, heavy bright red vaginal bleeding saturating more than 1 pad per hour, or fever greater than 100.4F. # Monitor blood pressure three times a day. Please document the blood pressure values to review with obstetrician at follow-up appointment in 1 week.   # If your blood pressure is equal to or greater than 160/110 (either one) OR if you experience any of the following symptoms: changes in vision, headache not relieved with Tylenol, severe abdominal pain, vomiting, increased vaginal bleeding, chest pain or shortness of breath, please return to the hospital.  # If your blood pressure is equal to or greater than 150/100 (either one), please call your obstetrician.  # Hold parameters: If the blood pressure is lower than 110/60 (either one) skip that dose of medication and inform your doctor.  # Please schedule an appointment at your physician office in 1 week.

## 2020-07-31 NOTE — CHART NOTE - NSCHARTNOTEFT_GEN_A_CORE
Admitting Diagnosis:   Patient is a 34y old  Female who presents with a chief complaint of     Consult: Yes [   ]  No [  x ]    Reason for Initial Nutrition Assessment:  Length Of Stay     PAST MEDICAL & SURGICAL HISTORY:      Current Nutrition Order:  Regular     PO Intake: Excellent (%) [   ]  Good (50-75%) [ x  ]  Fair (25-50%) [   ]  Poor (<25%) [   ]    GI Issues:   No apparent GI distress  Ordered for simethicone    Pain:  Being medically managed  Ordered for oxycodone, tylenol    Skin Integrity:  Surgical incision from C/S    Labs:   07-30    136  |  103  |  10  ----------------------------<  120<H>  3.6   |  23  |  0.50    Ca    8.7      30 Jul 2020 06:10    TPro  5.1<L>  /  Alb  2.9<L>  /  TBili  0.3  /  DBili  x   /  AST  18  /  ALT  6<L>  /  AlkPhos  96  07-30    CAPILLARY BLOOD GLUCOSE        Nutritionally Pertinent Lab Values:    Medications:  MEDICATIONS  (STANDING):  acetaminophen   Tablet .. 975 milliGRAM(s) Oral <User Schedule>  enoxaparin Injectable 40 milliGRAM(s) SubCutaneous every 12 hours  ibuprofen  Tablet. 600 milliGRAM(s) Oral every 6 hours  lactated ringers. 1000 milliLiter(s) (125 mL/Hr) IV Continuous <Continuous>  metoprolol succinate ER 25 milliGRAM(s) Oral at bedtime  oxytocin Infusion 333.333 milliUNIT(s)/Min (1000 mL/Hr) IV Continuous <Continuous>    MEDICATIONS  (PRN):  ALBUTerol    90 MICROgram(s) HFA Inhaler 2 Puff(s) Inhalation every 6 hours PRN Bronchospasm  diphenhydrAMINE 25 milliGRAM(s) Oral every 6 hours PRN Itching  lanolin Ointment 1 Application(s) Topical every 6 hours PRN Sore Nipples  magnesium hydroxide Suspension 30 milliLiter(s) Oral two times a day PRN Constipation  oxyCODONE    IR 5 milliGRAM(s) Oral once PRN Moderate to Severe Pain (4-10)  oxyCODONE    IR 5 milliGRAM(s) Oral every 3 hours PRN Moderate to Severe Pain (4-10)  simethicone 80 milliGRAM(s) Chew every 4 hours PRN Gas      Admitted Anthropometrics:  Height: 5'9", IBW 145lbs+/-10%, %%, BMI 40.5     Weight: 274lbs  PPW: 220lbs   Daily     Weight Change:    Pre-pregnancy weight is 220lbs, indicating a 55lb wt gain during pregnancy    Nutrition Focused Physical Exam: Completed [   ]  Unable to complete [   ]  N/A    Estimated energy needs:   IBW (65.9kg) used for calculations as pt >120% of IBW   Nutrient needs based on [facility] standards of care for maintenance in adults.   Needs adjusted for s/p C/S and breast feeding (+500kcal/day)  2147-2477kcal/day (25-30kcal/kg +500kcal/day)  79-92g pro/day (1.2-1.4g/kg)  2477-2306ml fluid/day (30-35ml/kg)    Subjective:   33yo POD#2 s/p LTCS.  Patient is stable and doing well post-operatively.  Pt seen in room, asleep in bed, w/  and baby at bedside. Pt left to rest, will attempt to f/u later to obtain more subjective information. Currently on a regular diet and tolerating well. No apparent GI distress, no N/V/D/C. No BMs documented in EMR. Unable to obtain diet/weight history at this time. Pre-pregnancy weight is 220lbs, indicating a 55lb wt gain during pregnancy. Mom is breastfeeding according to infant's EMR. No food allergies or intolerances documented in EMR. Skin: surgical incision from C/S; GI: WNL per flowsheet. RD to follow.     Nutrition Diagnosis:  Increased nutrient needs RT increased demand for kcal/pro AEB s/p C/S and breast feeding    Goal:  Pt to consistently meet % of estimated needs PO     Recommendations:  1. Continue on regular diet  2. Continue w/ prenatals while breastfeeding   3. Encourage hydration     Education:   N/A pt asleep    Risk Level: High [   ] Moderate [ x ] Low [   ]

## 2020-07-31 NOTE — DISCHARGE NOTE OB - HOSPITAL COURSE
Patient had uncomplicated low transverse  section for arrest.  Please see operative note for details.  During postpartum course patient's vitals were stable, vaginal bleeding appropriate, and pain well controlled.  Post operation day one hematocrit was appropriate.  On day of discharge patient was ambulating, her pain controlled with oral medications, had adequate oral intake, and was voiding freely.  Discharge instructions and precautions were given.  Will return to clinic in 2 weeks for incision check.

## 2020-07-31 NOTE — DISCHARGE NOTE OB - PATIENT PORTAL LINK FT
You can access the FollowMyHealth Patient Portal offered by [facility] by registering at the following website: [email or web address]. By joining [facility]’s FollowMyHealth portal, you will also be able to view your health information using other applications (apps) compatible with our system.

## 2020-07-31 NOTE — DISCHARGE NOTE OB - CARE PROVIDERS DIRECT ADDRESSES
[email or web address]

## 2020-07-31 NOTE — DISCHARGE NOTE OB - CARE PROVIDER_API CALL
JOHNNY JOSÉ  OBSTETRICS AND GYNECOLOGY  [address] [address]  Phone: [phone]  Fax: [phone]  Follow Up Time:

## 2020-07-31 NOTE — PROGRESS NOTE ADULT - PROBLEM SELECTOR PLAN 1
- Continue regular diet.  - Increase ambulation; lovenox and SCDs   - Continue motrin, tylenol, oxycodone PRN for pain control    Rigoberto Contreras PGY2

## 2020-07-31 NOTE — DISCHARGE NOTE OB - CARE PLAN
Principal Discharge DX:	Postpartum state  Goal:	happy healthy mom and baby!  Assessment and plan of treatment:	34 year old female s/p  section, post operative day 2, meeting all postpartum milestones. No heavy lifting. Pelvic rest until cleared. Take Motrin 600mg every 6 hours and/or tylenol 650mg every 6 hours as needed for pain. Call your Doctor  to schedule a follow up appointment in 1 week. Call your Doctor if you experience severe abdominal pain not improved by oral pain medications, heavy bright red vaginal bleeding saturating more than 1 pad per hour, or fever greater than 100.4F.  Secondary Diagnosis:	Pre-eclampsia added to pre-existing hypertension  Assessment and plan of treatment:	# Monitor blood pressure three times a day. Please document the blood pressure values to review with obstetrician at follow-up appointment in 1 week.   # If your blood pressure is equal to or greater than 160/110 (either one) OR if you experience any of the following symptoms: changes in vision, headache not relieved with Tylenol, severe abdominal pain, vomiting, increased vaginal bleeding, chest pain or shortness of breath, please return to the hospital.  # If your blood pressure is equal to or greater than 150/100 (either one), please call your obstetrician.  # Hold parameters: If the blood pressure is lower than 110/60 (either one) skip that dose of medication and inform your doctor.  # Please schedule an appointment at your physician office in 1 week.

## 2020-07-31 NOTE — PROGRESS NOTE ADULT - SUBJECTIVE AND OBJECTIVE BOX
OB Progress Note: LTCS, POD#2    S: 35yo POD#2 s/p LTCS. Pain is well controlled. She is tolerating a regular diet and passing flatus. She is voiding spontaneously, and ambulating without difficulty. Denies CP/SOB. Denies lightheadedness/dizziness. Denies N/V. Denies heavy vaginal bleeding.     O:  Vitals:  Vital Signs Last 24 Hrs  T(C): 36.7 (31 Jul 2020 06:03), Max: 37 (30 Jul 2020 22:00)  T(F): 98 (31 Jul 2020 06:03), Max: 98.6 (30 Jul 2020 22:00)  HR: 85 (31 Jul 2020 06:03) (84 - 88)  BP: 132/77 (31 Jul 2020 06:03) (104/70 - 132/77)  BP(mean): --  RR: 18 (31 Jul 2020 06:03) (18 - 18)  SpO2: 97% (31 Jul 2020 06:03) (96% - 99%)    MEDICATIONS  (STANDING):  acetaminophen   Tablet .. 975 milliGRAM(s) Oral <User Schedule>  diphtheria/tetanus/pertussis (acellular) Vaccine (ADAcel) 0.5 milliLiter(s) IntraMuscular once  enoxaparin Injectable 40 milliGRAM(s) SubCutaneous every 12 hours  ibuprofen  Tablet. 600 milliGRAM(s) Oral every 6 hours  lactated ringers. 1000 milliLiter(s) (125 mL/Hr) IV Continuous <Continuous>  metoprolol succinate ER 25 milliGRAM(s) Oral at bedtime  oxytocin Infusion 333.333 milliUNIT(s)/Min (1000 mL/Hr) IV Continuous <Continuous>      MEDICATIONS  (PRN):  ALBUTerol    90 MICROgram(s) HFA Inhaler 2 Puff(s) Inhalation every 6 hours PRN Bronchospasm  diphenhydrAMINE 25 milliGRAM(s) Oral every 6 hours PRN Itching  lanolin Ointment 1 Application(s) Topical every 6 hours PRN Sore Nipples  magnesium hydroxide Suspension 30 milliLiter(s) Oral two times a day PRN Constipation  oxyCODONE    IR 5 milliGRAM(s) Oral once PRN Moderate to Severe Pain (4-10)  oxyCODONE    IR 5 milliGRAM(s) Oral every 3 hours PRN Moderate to Severe Pain (4-10)  simethicone 80 milliGRAM(s) Chew every 4 hours PRN Gas      Labs:  Blood type: A Negative  Rubella IgG: RPR: Negative                          10.2<L>   11.25<H> >-----------< 126<L>    ( 07-30 @ 05:53 )             30.7<L>    07-30-20 @ 06:10            PE:  General: NAD  Abdomen: Soft, appropriately tender, incision c/d/i.  Extremities: No erythema, no pitting edema PAIN SCALE 5 OF 10.

## 2020-08-04 DIAGNOSIS — O99.89 OTHER SPECIFIED DISEASES AND CONDITIONS COMPLICATING PREGNANCY, CHILDBIRTH AND THE PUERPERIUM: ICD-10-CM

## 2020-08-04 DIAGNOSIS — A60.09 HERPESVIRAL INFECTION OF OTHER UROGENITAL TRACT: ICD-10-CM

## 2020-08-04 DIAGNOSIS — Z3A.37 37 WEEKS GESTATION OF PREGNANCY: ICD-10-CM

## 2020-08-04 DIAGNOSIS — J45.909 UNSPECIFIED ASTHMA, UNCOMPLICATED: ICD-10-CM

## 2020-08-04 DIAGNOSIS — E66.01 MORBID (SEVERE) OBESITY DUE TO EXCESS CALORIES: ICD-10-CM

## 2020-08-04 DIAGNOSIS — O34.13 MATERNAL CARE FOR BENIGN TUMOR OF CORPUS UTERI, THIRD TRIMESTER: ICD-10-CM

## 2020-08-04 DIAGNOSIS — D25.9 LEIOMYOMA OF UTERUS, UNSPECIFIED: ICD-10-CM

## 2020-08-04 DIAGNOSIS — O61.0 FAILED MEDICAL INDUCTION OF LABOR: ICD-10-CM

## 2020-08-06 LAB — SURGICAL PATHOLOGY STUDY: SIGNIFICANT CHANGE UP

## 2020-08-12 ENCOUNTER — APPOINTMENT (OUTPATIENT)
Dept: OBGYN | Facility: CLINIC | Age: 35
End: 2020-08-12
Payer: MEDICAID

## 2020-08-12 VITALS
DIASTOLIC BLOOD PRESSURE: 80 MMHG | WEIGHT: 258 LBS | BODY MASS INDEX: 39.1 KG/M2 | HEIGHT: 68 IN | SYSTOLIC BLOOD PRESSURE: 135 MMHG

## 2020-08-12 PROCEDURE — 99213 OFFICE O/P EST LOW 20 MIN: CPT | Mod: TH

## 2020-08-12 NOTE — HISTORY OF PRESENT ILLNESS
[Postpartum Follow Up] : postpartum follow up [Delivery Date: ___] : on [unfilled] [Female] : Delivery History: baby girl [Wt. ___] : weighing [unfilled] [Incisional Pain] : incisional pain [Primary C/S] : delivered by  section [None] : No associated symptoms are reported [Clean/Dry/Intact] : clean, dry and intact [Healed] : healed [Dehiscence] : not dehisced [Swelling] : not swollen [Erythema] : not erythematous [Doing Well] : is doing well [No Sign of Infection] : is showing no signs of infection [Excellent Pain Control] : has excellent pain control [de-identified] : rto 4 weeks, continue to monitor bp at home  [BF with Difficulty] : nursing without difficulty [Complications:___] : no complications [Breastfeeding] : not currently nursing [Abdominal Pain] : no abdominal pain [Breast Pain] : no breast pain [Back Pain] : no back pain [BreastFeeding Problems] : no breastfeeding problems [Chest Pain] : no chest pain [S/Sx PP Depression] : no signs/symptoms of postpartum depression [Episiotomy Site Pain] : no episiotomy site pain [Cracked Nipples] : no cracked nipples [Irregular Bleeding] : no irregular bleeding [Leg Pain] : no leg pain [Heavy Bleeding] : no heavy bleeding [Incisional Drainage] : no incisional drainage [Vaginal Discharge] : no vaginal discharge [Shortness of Breath] : no shortness of breath [Suicidal Ideation] : no suicidal ideation [Chills] : no chills [Fatigue] : no fatigue [Fever] : no fever [Headache] : no headache [Nausea] : no nausea [Vomiting] : no vomiting

## 2020-08-25 ENCOUNTER — APPOINTMENT (OUTPATIENT)
Dept: OBGYN | Facility: CLINIC | Age: 35
End: 2020-08-25
Payer: MEDICAID

## 2020-08-25 VITALS
DIASTOLIC BLOOD PRESSURE: 100 MMHG | WEIGHT: 257 LBS | HEIGHT: 68 IN | SYSTOLIC BLOOD PRESSURE: 150 MMHG | BODY MASS INDEX: 38.95 KG/M2

## 2020-08-25 PROCEDURE — 12021 TX SUPFC WND DEHSN W/PACKING: CPT

## 2020-08-25 PROCEDURE — 99213 OFFICE O/P EST LOW 20 MIN: CPT | Mod: TH,25

## 2020-08-25 RX ORDER — CEPHALEXIN 250 MG/1
250 CAPSULE ORAL 4 TIMES DAILY
Qty: 28 | Refills: 0 | Status: ACTIVE | COMMUNITY
Start: 2020-08-25 | End: 1900-01-01

## 2020-08-25 NOTE — HISTORY OF PRESENT ILLNESS
[Delivery Date: ___] : on [unfilled] [Postpartum Follow Up] : postpartum follow up [Primary C/S] : delivered by  section [Wt. ___] : weighing [unfilled] [Female] : Delivery History: baby girl [Incisional Drainage] : incisional drainage [Incisional Pain] : incisional pain [Breastfeeding] : not currently nursing [BF with Difficulty] : nursing without difficulty [Abdominal Pain] : no abdominal pain [Back Pain] : no back pain [Breast Pain] : no breast pain [BreastFeeding Problems] : no breastfeeding problems [Chest Pain] : no chest pain [Cracked Nipples] : no cracked nipples [S/Sx PP Depression] : no signs/symptoms of postpartum depression [Episiotomy Site Pain] : no episiotomy site pain [Heavy Bleeding] : no heavy bleeding [Irregular Bleeding] : no irregular bleeding [Leg Pain] : no leg pain [Shortness of Breath] : no shortness of breath [Suicidal Ideation] : no suicidal ideation [Vaginal Discharge] : no vaginal discharge [Chills] : no chills [Fatigue] : no fatigue [Dysuria] : no dysuria [Fever] : no fever [Headache] : no headache [Nausea] : no nausea [Vomiting] : no vomiting [Erythema] : erythematous [Dehiscence] : dehisced [Mild] : mild vaginal bleeding [Obese] : obese [Normal] : normal [Scar] : a scar was noted [Suprapubic] : in the suprapubic area [Soft, Nontender] : the abdomen was soft and nontender [Firm] : not firm [Rigid] : not rigid [Rebound] : no rebound [Guarding] : no guarding [No Mass] : no masses were palpated [No HSM] : no hepatosplenomegaly noted [None] : no CVA tenderness [Wound Drained & Cultures Sent] : wound drained and cultures sent [No [place]] : to avoid sexual intercourse [Limited ADLs] : to participate in activities of daily living with limitations [No Work] : not to work [Limited Housework] : to do housework with limitations [No Sports] : not to participate in sports [de-identified] : SEROMA  [de-identified] : AREA WAS PACKED AND STERILE DRESSING WAS APPLIED.  RTO 1 WEEK.  KEFLEX 1 PO Q 6 HRS X 1 WEEK

## 2020-09-03 ENCOUNTER — APPOINTMENT (OUTPATIENT)
Dept: OBGYN | Facility: CLINIC | Age: 35
End: 2020-09-03

## 2021-06-29 ENCOUNTER — APPOINTMENT (OUTPATIENT)
Dept: PULMONOLOGY | Facility: CLINIC | Age: 36
End: 2021-06-29
Payer: MEDICAID

## 2021-06-29 VITALS
TEMPERATURE: 97.5 F | HEART RATE: 85 BPM | HEIGHT: 68 IN | WEIGHT: 285 LBS | OXYGEN SATURATION: 98 % | RESPIRATION RATE: 14 BRPM | DIASTOLIC BLOOD PRESSURE: 88 MMHG | BODY MASS INDEX: 43.19 KG/M2 | SYSTOLIC BLOOD PRESSURE: 146 MMHG

## 2021-06-29 DIAGNOSIS — J45.909 UNSPECIFIED ASTHMA, UNCOMPLICATED: ICD-10-CM

## 2021-06-29 DIAGNOSIS — Z82.49 FAMILY HISTORY OF ISCHEMIC HEART DISEASE AND OTHER DISEASES OF THE CIRCULATORY SYSTEM: ICD-10-CM

## 2021-06-29 DIAGNOSIS — Z87.891 PERSONAL HISTORY OF NICOTINE DEPENDENCE: ICD-10-CM

## 2021-06-29 DIAGNOSIS — Z01.818 ENCOUNTER FOR OTHER PREPROCEDURAL EXAMINATION: ICD-10-CM

## 2021-06-29 DIAGNOSIS — Z87.410 PERSONAL HISTORY OF CERVICAL DYSPLASIA: ICD-10-CM

## 2021-06-29 DIAGNOSIS — I10 ESSENTIAL (PRIMARY) HYPERTENSION: ICD-10-CM

## 2021-06-29 DIAGNOSIS — E78.00 PURE HYPERCHOLESTEROLEMIA, UNSPECIFIED: ICD-10-CM

## 2021-06-29 DIAGNOSIS — Z80.3 FAMILY HISTORY OF MALIGNANT NEOPLASM OF BREAST: ICD-10-CM

## 2021-06-29 PROCEDURE — 94060 EVALUATION OF WHEEZING: CPT

## 2021-06-29 PROCEDURE — 94726 PLETHYSMOGRAPHY LUNG VOLUMES: CPT

## 2021-06-29 PROCEDURE — 94729 DIFFUSING CAPACITY: CPT

## 2021-06-29 PROCEDURE — 99213 OFFICE O/P EST LOW 20 MIN: CPT | Mod: 25

## 2021-06-29 NOTE — HISTORY OF PRESENT ILLNESS
[Never] : never [Former] : former [TextBox_4] : Patient is a 35-year-old morbidly obese female who presents today for pulmonary consult.  The patient is scheduled for bariatric surgery.  She currently denies fevers chills chest pain weight loss or hemoptysis

## 2021-06-29 NOTE — ASSESSMENT
[FreeTextEntry1] : In summary the patient is a 35-year-old obese female who presents today for pulmonary evaluation.  The patient is scheduled for bariatric surgery.  The patient underwent a pulmonary function test which revealed lung volumes. The patient is currently in her usual state of health and is medically cleared for this elective surgical procedure

## 2021-07-06 ENCOUNTER — NON-APPOINTMENT (OUTPATIENT)
Age: 36
End: 2021-07-06

## 2021-07-06 ENCOUNTER — APPOINTMENT (OUTPATIENT)
Dept: HEART AND VASCULAR | Facility: CLINIC | Age: 36
End: 2021-07-06
Payer: MEDICAID

## 2021-07-06 VITALS
TEMPERATURE: 97.8 F | HEART RATE: 84 BPM | WEIGHT: 287 LBS | DIASTOLIC BLOOD PRESSURE: 96 MMHG | RESPIRATION RATE: 16 BRPM | BODY MASS INDEX: 43.5 KG/M2 | SYSTOLIC BLOOD PRESSURE: 144 MMHG | HEIGHT: 68 IN | OXYGEN SATURATION: 98 %

## 2021-07-06 DIAGNOSIS — R06.00 DYSPNEA, UNSPECIFIED: ICD-10-CM

## 2021-07-06 PROCEDURE — 99203 OFFICE O/P NEW LOW 30 MIN: CPT

## 2021-07-06 NOTE — REASON FOR VISIT
[FreeTextEntry1] : 34 yo F with hTN, asthma, HLD, anxiety morbid obesity here for first evaluation and preop evaluation prior to bariatric surgery\par The patient reports a significant weight gain in the last few months particularly after delivery of her first daughter months ago.\par She can walk up to 2 blocks without any chest pain, limited by generalized fatigue\par She denies any chest pain but reports episode of chest pressure only when she has anxiety attacks\par \par \par PMH\par as above\par \par PSH\par hernia repair\par C section\par \par ALL\par NKDA\par \par MEDS\par buspirone \par sertraline \par metformin\par metoprolol succinate  50 mg daily \par rosuvastatin 5 mg daily \par albuterol\par \par FH\par grandmother with MI and HF > 55 years aof age \par \par SH\par smokes 2-3 day, no drugs, no etoh\par \par \par EKG 7/6/2021\par Sinus rhythm, within normal limits\par

## 2021-07-06 NOTE — ASSESSMENT
[FreeTextEntry1] : 34 yo F with hTN, asthma, HLD, anxiety morbid obesity here for first evaluation and preop evaluation prior to bariatric surgery\par \par PRE OP EVALUATION PRIOR TO BARIATRIC SURGERY\par -We will obtain an echocardiogram to rule out any wall motion abnormalities\par -In the setting of fair exercise tolerance without any symptoms, if echo is wnl the she can proceed with bariatric surgery without the need of any additional cardiological work-up\par -Recommend to contain the patient euvolemic and normotensive perioperatively\par -Continue with beta-blockers perioperatively (the patient has been on it for a long time)\par \par HTN\par -not optimally controlled today\par -Commend to monitor blood pressure at home blood pressure log at home\par -Would recommend starting losartan 25 mg daily if blood pressure remains uncontrolled\par \par Follow-up in 2 weeks for echo results and BP checkup prior to surgery

## 2021-07-13 ENCOUNTER — APPOINTMENT (OUTPATIENT)
Dept: HEART AND VASCULAR | Facility: CLINIC | Age: 36
End: 2021-07-13
Payer: MEDICAID

## 2021-07-13 PROCEDURE — 93306 TTE W/DOPPLER COMPLETE: CPT

## 2021-08-03 ENCOUNTER — APPOINTMENT (OUTPATIENT)
Dept: HEART AND VASCULAR | Facility: CLINIC | Age: 36
End: 2021-08-03
Payer: MEDICAID

## 2021-08-03 VITALS
BODY MASS INDEX: 42.59 KG/M2 | HEIGHT: 68 IN | HEART RATE: 88 BPM | OXYGEN SATURATION: 98 % | SYSTOLIC BLOOD PRESSURE: 153 MMHG | DIASTOLIC BLOOD PRESSURE: 90 MMHG | WEIGHT: 281 LBS | TEMPERATURE: 97.3 F | RESPIRATION RATE: 14 BRPM

## 2021-08-03 PROCEDURE — 99214 OFFICE O/P EST MOD 30 MIN: CPT

## 2021-08-03 NOTE — ASSESSMENT
[FreeTextEntry1] : 36 yo F with hTN, asthma, HLD, anxiety morbid obesity here forpre op evaluation prior to bariatric surgery\par \par PRE OP EVALUATION PRIOR TO BARIATRIC SURGERY\par -The patient has fair exercise tolerance\par -Echocardiogram is within normal limits\par -EKG is within normal limits\par -In the setting of the above the patient can proceed with bariatric surgery without the need of any additional cardiological work-up\par -Recommend to maintain the patient euvolemic and normotensive perioperatively\par \par HTN\par -not optimally controlled today\par -The patient reports that she was switched from lisinopril to metoprolol during her pregnancy\par -The setting of uncontrolled blood pressure day we will stop beta-blocker (recommended reducing in half the dosing for the first week and then stopping it in 2 weeks)\par -Start lisinopril 5 mg daily, with plan to uptitrate if blood pressure is not controlled\par -recommend to monitor blood pressure at home blood pressure log at home\par \par Follow-up in 1 week with phone call for BP check and in-office follow up in 4 months

## 2021-08-03 NOTE — REASON FOR VISIT
[FreeTextEntry1] : 34 yo F with hTN, asthma, HLD, anxiety morbid obesity here for and preop evaluation prior to bariatric surgery and obtain echo results \par The patient reports a significant weight gain in the last few months particularly after delivery of her first daughter months ago.\par She can walk up to 2 blocks without any chest pain, limited by generalized fatigue\par She denies any chest pain but reports episode of chest pressure only when she has anxiety attacks\par \par \par \par PMH\par as above\par \par PSH\par hernia repair\par C section\par \par ALL\par NKDA\par \par MEDS\par buspirone \par sertraline \par metformin\par metoprolol succinate  50 mg daily \par rosuvastatin 5 mg daily \par albuterol\par \par FH\par grandmother with MI and HF > 55 years aof age \par \par SH\par smokes 2-3 day, no drugs, no etoh\par \par \par EKG 7/6/2021\par Sinus rhythm, within normal limits\par \par \par Echocardiogram 7/13/21\par Left ventricular ejection fraction is 55 to 60%\par Mild concentric LVH\par \par \par

## 2021-08-10 ENCOUNTER — NON-APPOINTMENT (OUTPATIENT)
Age: 36
End: 2021-08-10

## 2021-08-27 ENCOUNTER — RX CHANGE (OUTPATIENT)
Age: 36
End: 2021-08-27

## 2021-10-04 ENCOUNTER — RX CHANGE (OUTPATIENT)
Age: 36
End: 2021-10-04

## 2021-10-04 RX ORDER — LISINOPRIL 5 MG/1
5 TABLET ORAL
Qty: 90 | Refills: 1 | Status: ACTIVE | COMMUNITY
Start: 2021-08-03 | End: 1900-01-01

## 2024-02-14 ENCOUNTER — NON-APPOINTMENT (OUTPATIENT)
Age: 39
End: 2024-02-14

## 2024-02-14 ENCOUNTER — APPOINTMENT (OUTPATIENT)
Dept: HEART AND VASCULAR | Facility: CLINIC | Age: 39
End: 2024-02-14
Payer: MEDICAID

## 2024-02-14 VITALS
DIASTOLIC BLOOD PRESSURE: 89 MMHG | BODY MASS INDEX: 26.98 KG/M2 | SYSTOLIC BLOOD PRESSURE: 151 MMHG | OXYGEN SATURATION: 98 % | HEIGHT: 68 IN | WEIGHT: 178 LBS | RESPIRATION RATE: 14 BRPM | HEART RATE: 76 BPM

## 2024-02-14 DIAGNOSIS — R00.2 PALPITATIONS: ICD-10-CM

## 2024-02-14 DIAGNOSIS — R07.89 OTHER CHEST PAIN: ICD-10-CM

## 2024-02-14 PROCEDURE — 99205 OFFICE O/P NEW HI 60 MIN: CPT | Mod: 25

## 2024-02-14 PROCEDURE — 93000 ELECTROCARDIOGRAM COMPLETE: CPT

## 2024-02-14 NOTE — HISTORY OF PRESENT ILLNESS
[FreeTextEntry1] : 38-year-old woman, with past medical history of hypertension, hyperlipidemia, asthma, anxiety, and obesity, presenting for establishment of cardiovascular care. Within the last week, while at rest in bed patient felt sensation like a "small hammer hitting the left side of (her) chest" after which she felt he sustained left-sided chest soreness.  This has been associated with inability to take a satisfying deep breath.  Additionally, yesterday while washing dishes the patient felt palpitations in the form of her "chest pounding for 5 beats" which was accompanied by mild dizziness, and resolved after the patient held onto a chair.  Otherwise, she denies LOC, PND, orthopnea, HAs, abdominal pain, and LE swelling.

## 2024-02-14 NOTE — ASSESSMENT
[FreeTextEntry1] : 38-year-old woman, with past medical history of hypertension, hyperlipidemia, asthma, anxiety, and obesity, presenting for establishment of cardiovascular care.  CP/Palpitations: Within the last week, while at rest in bed patient felt sensation like a "small hammer hitting the left side of (her) chest" after which she felt he sustained left-sided chest soreness.  This has been associated with inability to take a satisfying deep breath.  Additionally, yesterday while washing dishes the patient felt palpitations in the form of her "chest pounding for 5 beats" which was accompanied by mild dizziness, and resolved after the patient held onto a chair.  Otherwise, she denies LOC, PND, orthopnea, HAs, abdominal pain, and LE swelling.  Physical exam and ECG unremarkable this time. - TTE ordered today - MCT x14 days ordered for further assessment of rhythm during episodes of chest discomfort, dizziness and palpitations - patient instructed to contact me and/or seek immediate medical attention if symptoms recur or worsen  HTN: BP controlled at this time off all medications (s/p weight loss). Patient previously taking lisinopril 5 mg daily, and metoprolol tartrate 25 mg twice a day. - will follow-up blood pressure at ensuing visits  Overweight: BMI 27.07 kg/m2, down from >40 kg/m2 in 2021 since having gastric sleeve surgery. - lifestyle modifications (diet and exercise)  - weight loss counseling - increase aerobic exercise as tolerated to aim for approximately 30 minutes of activity 5 days a week - heart healthy diet low in sodium, sugars and saturated fats and high in fruits, vegetables and whole grains  F/u s/p TTE and MCT.

## 2024-02-14 NOTE — CARDIOLOGY SUMMARY
[de-identified] : 2/14/24: NSR at 76 bpm [de-identified] : TTE 7/28/21: LVEF 55 to 60%, normal left ventricular internal cavity size, mild concentric left ventricular hypertrophy, normal right ventricular size systolic function, no significant valvulopathy, mildly enlarged left atrium.

## 2024-03-29 ENCOUNTER — APPOINTMENT (OUTPATIENT)
Dept: INTERNAL MEDICINE | Facility: CLINIC | Age: 39
End: 2024-03-29

## 2024-04-01 ENCOUNTER — APPOINTMENT (OUTPATIENT)
Dept: HEART AND VASCULAR | Facility: CLINIC | Age: 39
End: 2024-04-01
Payer: MEDICAID

## 2024-04-01 PROCEDURE — 93306 TTE W/DOPPLER COMPLETE: CPT

## 2024-04-26 RX ORDER — METOPROLOL TARTRATE 25 MG/1
25 TABLET, FILM COATED ORAL
Qty: 90 | Refills: 3 | Status: ACTIVE | COMMUNITY
Start: 2024-04-26 | End: 1900-01-01

## 2024-04-26 RX ORDER — METOPROLOL TARTRATE 25 MG/1
25 TABLET, FILM COATED ORAL
Refills: 0 | Status: DISCONTINUED | COMMUNITY
End: 2024-04-26

## 2024-07-16 ENCOUNTER — NON-APPOINTMENT (OUTPATIENT)
Age: 39
End: 2024-07-16

## 2024-07-16 ENCOUNTER — TRANSCRIPTION ENCOUNTER (OUTPATIENT)
Age: 39
End: 2024-07-16

## 2024-07-16 ENCOUNTER — APPOINTMENT (OUTPATIENT)
Dept: OBGYN | Facility: CLINIC | Age: 39
End: 2024-07-16

## 2024-07-16 VITALS
OXYGEN SATURATION: 98 % | BODY MASS INDEX: 26.3 KG/M2 | SYSTOLIC BLOOD PRESSURE: 110 MMHG | HEART RATE: 82 BPM | DIASTOLIC BLOOD PRESSURE: 80 MMHG | WEIGHT: 173 LBS

## 2024-07-16 PROCEDURE — 99204 OFFICE O/P NEW MOD 45 MIN: CPT

## 2024-07-22 DIAGNOSIS — D64.9 ANEMIA, UNSPECIFIED: ICD-10-CM

## 2024-07-22 RX ORDER — CHLORHEXIDINE GLUCONATE 4 %
325 (65 FE) LIQUID (ML) TOPICAL DAILY
Qty: 60 | Refills: 2 | Status: ACTIVE | COMMUNITY
Start: 2024-07-22 | End: 1900-01-01

## 2024-07-22 RX ORDER — FOLIC ACID 1 MG/1
1 TABLET ORAL DAILY
Qty: 60 | Refills: 2 | Status: ACTIVE | COMMUNITY
Start: 2024-07-22 | End: 1900-01-01

## 2024-07-23 ENCOUNTER — NON-APPOINTMENT (OUTPATIENT)
Age: 39
End: 2024-07-23

## 2024-07-23 LAB
ABO + RH PNL BLD: NORMAL
ALBUMIN SERPL ELPH-MCNC: 4.1 G/DL
ALP BLD-CCNC: 51 U/L
ALT SERPL-CCNC: 6 U/L
ANION GAP SERPL CALC-SCNC: 9 MMOL/L
AST SERPL-CCNC: 13 U/L
B19V IGG SER QL IA: 0.23 INDEX
B19V IGG+IGM SER-IMP: NEGATIVE
B19V IGG+IGM SER-IMP: NORMAL
B19V IGM FLD-ACNC: 0.3 INDEX
B19V IGM SER-ACNC: NEGATIVE
BACTERIA UR CULT: NORMAL
BILIRUB SERPL-MCNC: 0.3 MG/DL
BLD GP AB SCN SERPL QL: NORMAL
BUN SERPL-MCNC: 9 MG/DL
C TRACH RRNA SPEC QL NAA+PROBE: NOT DETECTED
CALCIUM SERPL-MCNC: 8.9 MG/DL
CHLORIDE SERPL-SCNC: 107 MMOL/L
CMV IGG SERPL QL: <0.2 U/ML
CMV IGG SERPL-IMP: NEGATIVE
CMV IGM SERPL QL: <8 AU/ML
CMV IGM SERPL QL: NEGATIVE
CO2 SERPL-SCNC: 23 MMOL/L
CREAT SERPL-MCNC: 0.6 MG/DL
CYTOLOGY CVX/VAG DOC THIN PREP: NORMAL
EGFR: 117 ML/MIN/1.73M2
FMR1 GENE MUT ANL BLD/T: NORMAL
FOLATE RBC-MCNC: 954 NG/ML
GLUCOSE SERPL-MCNC: 100 MG/DL
HBV SURFACE AG SER QL: NONREACTIVE
HCT VFR BLD CALC: 32.7 %
HCT VFR BLD CALC: 32.8 %
HCV AB SER QL: NONREACTIVE
HCV S/CO RATIO: 0.07 S/CO
HGB A MFR BLD: 98.2 %
HGB A2 MFR BLD: 1.8 %
HGB BLD-MCNC: 9.6 G/DL
HGB FRACT BLD-IMP: NORMAL
HIV1+2 AB SPEC QL IA.RAPID: NONREACTIVE
MCHC RBC-ENTMCNC: 23.4 PG
MCHC RBC-ENTMCNC: 29.4 GM/DL
MCV RBC AUTO: 79.8 FL
MEV IGG FLD QL IA: 271 AU/ML
MEV IGG+IGM SER-IMP: POSITIVE
MUV AB SER-ACNC: POSITIVE
MUV IGG SER QL IA: 260 AU/ML
N GONORRHOEA RRNA SPEC QL NAA+PROBE: NOT DETECTED
PLATELET # BLD AUTO: 137 K/UL
POTASSIUM SERPL-SCNC: 3.9 MMOL/L
PROT SERPL-MCNC: 6.4 G/DL
RBC # BLD: 4.1 M/UL
RBC # FLD: 19.2 %
RUBV IGG FLD-ACNC: 6.97 INDEX
RUBV IGG SER-IMP: POSITIVE
SODIUM SERPL-SCNC: 138 MMOL/L
SOURCE AMPLIFICATION: NORMAL
T GONDII AB SER-IMP: NEGATIVE
T GONDII AB SER-IMP: NEGATIVE
T GONDII IGG SER QL: <3 IU/ML
T GONDII IGM SER QL: <3 AU/ML
T PALLIDUM AB SER QL IA: NEGATIVE
VIT B12 SERPL-MCNC: 564 PG/ML
VZV AB TITR SER: POSITIVE
VZV IGG SER IF-ACNC: 1157 INDEX
WBC # FLD AUTO: 5.3 K/UL

## 2024-07-26 LAB
AR GENE MUT ANL BLD/T: NORMAL
CFTR MUT TESTED BLD/T: NEGATIVE

## 2024-08-08 ENCOUNTER — TRANSCRIPTION ENCOUNTER (OUTPATIENT)
Age: 39
End: 2024-08-08

## 2024-08-12 ENCOUNTER — NON-APPOINTMENT (OUTPATIENT)
Age: 39
End: 2024-08-12

## 2024-08-13 ENCOUNTER — APPOINTMENT (OUTPATIENT)
Dept: ANTEPARTUM | Facility: CLINIC | Age: 39
End: 2024-08-13
Payer: MEDICAID

## 2024-08-13 ENCOUNTER — ASOB RESULT (OUTPATIENT)
Age: 39
End: 2024-08-13

## 2024-08-13 PROCEDURE — 93976 VASCULAR STUDY: CPT

## 2024-08-13 PROCEDURE — 76801 OB US < 14 WKS SINGLE FETUS: CPT

## 2024-08-13 PROCEDURE — 76813 OB US NUCHAL MEAS 1 GEST: CPT

## 2024-08-14 ENCOUNTER — NON-APPOINTMENT (OUTPATIENT)
Age: 39
End: 2024-08-14

## 2024-09-04 ENCOUNTER — NON-APPOINTMENT (OUTPATIENT)
Age: 39
End: 2024-09-04

## 2024-09-09 ENCOUNTER — APPOINTMENT (OUTPATIENT)
Dept: OBGYN | Facility: CLINIC | Age: 39
End: 2024-09-09

## 2024-09-09 VITALS
SYSTOLIC BLOOD PRESSURE: 103 MMHG | WEIGHT: 171.96 LBS | BODY MASS INDEX: 26.15 KG/M2 | DIASTOLIC BLOOD PRESSURE: 68 MMHG | HEART RATE: 91 BPM | OXYGEN SATURATION: 98 %

## 2024-09-09 DIAGNOSIS — Z34.90 ENCOUNTER FOR SUPERVISION OF NORMAL PREGNANCY, UNSPECIFIED, UNSPECIFIED TRIMESTER: ICD-10-CM

## 2024-09-09 PROCEDURE — 0502F SUBSEQUENT PRENATAL CARE: CPT

## 2024-09-10 LAB
AF-AFP DISCLAIMER: NORMAL
AFP  MOM: 1.32
AFP CONCENTRATION: 34.59 NG/ML
AFP INTERPRETATION: NORMAL
AFP MOM CUT-OFF: 2.5
AFP PERCENTILE: 80.7
AFP SCREENING RESULT: NORMAL
AFTER SCREENING RISK OPEN SPINA BIFIDA: NORMAL
BEFORE SCREENING RISK OPEN SPINA BIFIDA: NORMAL
CARBAMAZEPINE?: NO
CURRENT SMOKER: NORMAL
ESTIMATED DUE DATE: NORMAL
EXTREME ANALYTE ALERT: NO
FAMILY HISTORY OPEN SPINA BIFIDA: NORMAL
GESTATIONAL  AGE: NORMAL
GESTATIONAL AGE METHOD: NORMAL
INSULIN DEPEND DIABETES: NORMAL
MATERNAL WGT: 171.96
MULTIPLE PREGNANCY STATUS: NORMAL
RACE/ETHNICITY: NORMAL
VALPROIC ACID?: NORMAL

## 2024-09-23 ENCOUNTER — APPOINTMENT (OUTPATIENT)
Dept: HEMATOLOGY ONCOLOGY | Facility: CLINIC | Age: 39
End: 2024-09-23
Payer: MEDICAID

## 2024-09-23 VITALS
DIASTOLIC BLOOD PRESSURE: 75 MMHG | WEIGHT: 178 LBS | HEIGHT: 68 IN | BODY MASS INDEX: 26.98 KG/M2 | SYSTOLIC BLOOD PRESSURE: 127 MMHG | HEART RATE: 77 BPM | RESPIRATION RATE: 18 BRPM | TEMPERATURE: 98.7 F | OXYGEN SATURATION: 98 %

## 2024-09-23 DIAGNOSIS — D64.9 ANEMIA, UNSPECIFIED: ICD-10-CM

## 2024-09-23 PROCEDURE — 99203 OFFICE O/P NEW LOW 30 MIN: CPT

## 2024-09-24 LAB
FERRITIN SERPL-MCNC: 7 NG/ML
FOLATE SERPL-MCNC: 13.2 NG/ML
HCT VFR BLD CALC: 32.7 %
HGB BLD-MCNC: 10.7 G/DL
IRON SATN MFR SERPL: 8 %
IRON SERPL-MCNC: 30 UG/DL
LYMPHOCYTES # BLD AUTO: 1 K/UL
LYMPHOCYTES NFR BLD AUTO: 12.5 %
MAN DIFF?: NO
MCHC RBC-ENTMCNC: 26.8 PG
MCHC RBC-ENTMCNC: 32.7 GM/DL
MCV RBC AUTO: 81.8 FL
NEUTROPHILS # BLD AUTO: 6.3 K/UL
NEUTROPHILS NFR BLD AUTO: 80.9 %
PLATELET # BLD AUTO: 159 K/UL
RBC # BLD: 4 M/UL
RBC # FLD: 19.3 %
TIBC SERPL-MCNC: 368 UG/DL
TSH SERPL-ACNC: 2.12 UIU/ML
UIBC SERPL-MCNC: 338 UG/DL
VIT B12 SERPL-MCNC: 532 PG/ML
WBC # FLD AUTO: 7.8 K/UL

## 2024-09-26 NOTE — HISTORY OF PRESENT ILLNESS
[de-identified] : 39F   with a hx of bariatric surgery (10/2021) presents for anemia in pregnancy. Patient diagnosed with iron deficiency anemia however unable to tolerate PO iron. Iron intake leads to severe emesis.  Diagnosed with anemia prior to pregnancy Correlates anemia to low PO intake s/p bariatric surgery. Currently 16 weeks pregnant. Reports dizziness, severe fatigue, and sporadic palpitations. Denies hx of melena, hematochezia, hematemesis. Reports episodes of hemorrhoids. Had pre-op colonoscopy  WNL. No malabsoprtive disorders. No drug allergies, Reports living in pre-war building with known lead. Denies confusions, headaches, irritability.   Social: prior tobacco use 1ppd for 20 years (quit 4 years ago); social alcohol use prior to pregnancy Family hx: paternal grandmother with anemia; maternal grandma with breast cancer  Medications: Metoprolol 12.5  BID, folic acid, ferrous sulfate

## 2024-09-26 NOTE — HISTORY OF PRESENT ILLNESS
[de-identified] : 39F   with a hx of bariatric surgery (10/2021) presents for anemia in pregnancy. Patient diagnosed with iron deficiency anemia however unable to tolerate PO iron. Iron intake leads to severe emesis.  Diagnosed with anemia prior to pregnancy Correlates anemia to low PO intake s/p bariatric surgery. Currently 16 weeks pregnant. Reports dizziness, severe fatigue, and sporadic palpitations. Denies hx of melena, hematochezia, hematemesis. Reports episodes of hemorrhoids. Had pre-op colonoscopy  WNL. No malabsoprtive disorders. No drug allergies, Reports living in pre-war building with known lead. Denies confusions, headaches, irritability.   Social: prior tobacco use 1ppd for 20 years (quit 4 years ago); social alcohol use prior to pregnancy Family hx: paternal grandmother with anemia; maternal grandma with breast cancer  Medications: Metoprolol 12.5  BID, folic acid, ferrous sulfate

## 2024-09-26 NOTE — ASSESSMENT
[FreeTextEntry1] :  39F  with a hx of bariatric surgery (10/2021) presents for anemia in pregnancy.   # Anemia in Pregnancy - Pt with prior hx of anemia d/t bariatric surgery now presenting with anemia in pregnancy - labs significant for Hgb 10.7, MCV 81.8, % sat 8, Ferritin 7 - B12/ folate within range - given patient unable to tolerate PO iron, patient will start IV infusions qweekly x4 doses  - will f/u 6w in clinic

## 2024-10-03 ENCOUNTER — APPOINTMENT (OUTPATIENT)
Dept: HEART AND VASCULAR | Facility: CLINIC | Age: 39
End: 2024-10-03
Payer: MEDICAID

## 2024-10-03 ENCOUNTER — NON-APPOINTMENT (OUTPATIENT)
Age: 39
End: 2024-10-03

## 2024-10-03 VITALS
SYSTOLIC BLOOD PRESSURE: 116 MMHG | DIASTOLIC BLOOD PRESSURE: 78 MMHG | OXYGEN SATURATION: 97 % | HEART RATE: 77 BPM | HEIGHT: 68 IN

## 2024-10-03 VITALS — WEIGHT: 178 LBS | BODY MASS INDEX: 27.07 KG/M2

## 2024-10-03 PROCEDURE — 99204 OFFICE O/P NEW MOD 45 MIN: CPT

## 2024-10-03 PROCEDURE — G2211 COMPLEX E/M VISIT ADD ON: CPT | Mod: NC

## 2024-10-03 NOTE — ASSESSMENT
[FreeTextEntry1] : 39 year-old woman @ 17 wks with past medical history of hypertension, hyperlipidemia, preeclampsia, asthma presenting for palpitations, Prior holter demonstrated NSVT/PVCs.  Echo demonstrated normal LVEF.   Ms. LOPEZ is at increased risk of preeclampsia, she should continue  mg daily  C/w metoprolol, may increase for symptoms as needed  Will need echo in 3rd trimester  We discussed signs and symptoms of cardiac disease vs pregnancy and when to seek immediate medical advice.  These include but are not limited to extreme swelling or weight gain, extreme fatigue, cough, pain or fast heart beat, severe shortness of breath, especially when lying down, chest pain or severe of breath, or otherwise feel extremely sick.  Follow up 24-28 wks

## 2024-10-03 NOTE — HISTORY OF PRESENT ILLNESS
[FreeTextEntry1] : 39 year-old woman @ 17 wks with past medical history of hypertension, hyperlipidemia, preeclampsia, asthma presenting for palpitations   She has palpitations, unable to take a deep breath. Holter  demonstrated PVCs and NSVT.  She is currently on 12.5 mg metoprolol bid, improved symptoms.  For the past several days palpitations, getting worse, 3-4 beats at a time.  Longest episode 7 beat.  Occasionally associated with dizziness.   She runs around with her daughter.  She walks for exercise.   She has HA, q 2-3 days.   She denies LOC, PND, orthopnea, and LE swelling. She can walk a flight of stairs  ObHx: G1 FT pCS s/p failed induction at 37 weeks for preeclampsia without SF (per patient)   Gastric sleeve , lost 140lbs  Quit smoking in , >20 yrs, 1 pk/day  stay at home

## 2024-10-03 NOTE — CARDIOLOGY SUMMARY
[de-identified] : Holter monitor 4/26/24: NSVT 4 beats [de-identified] : 4/1/2024 1. Left ventricular cavity is normal in size. Left ventricular wall thickness is normal. Left ventricular systolic function is normal with an ejection fraction visually estimated at 60 to 65 %. There are no regional wall motion abnormalities seen. 2. Normal left ventricular diastolic function, with normal filling pressure. 3. Normal right ventricular cavity size, with normal wall thickness, and normal systolic function. 4. Normal left and right atrial size. 5. No significant valvular disease. 6. No pericardial effusion seen.

## 2024-10-07 ENCOUNTER — ASOB RESULT (OUTPATIENT)
Age: 39
End: 2024-10-07

## 2024-10-07 ENCOUNTER — APPOINTMENT (OUTPATIENT)
Dept: OBGYN | Facility: CLINIC | Age: 39
End: 2024-10-07

## 2024-10-07 ENCOUNTER — APPOINTMENT (OUTPATIENT)
Dept: ANTEPARTUM | Facility: CLINIC | Age: 39
End: 2024-10-07
Payer: MEDICAID

## 2024-10-07 VITALS
BODY MASS INDEX: 27.98 KG/M2 | OXYGEN SATURATION: 98 % | SYSTOLIC BLOOD PRESSURE: 130 MMHG | HEART RATE: 71 BPM | DIASTOLIC BLOOD PRESSURE: 79 MMHG | WEIGHT: 184 LBS

## 2024-10-07 DIAGNOSIS — Z3A.15 15 WEEKS GESTATION OF PREGNANCY: ICD-10-CM

## 2024-10-07 PROCEDURE — 76811 OB US DETAILED SNGL FETUS: CPT

## 2024-10-07 PROCEDURE — 76817 TRANSVAGINAL US OBSTETRIC: CPT

## 2024-10-07 PROCEDURE — ZZZZZ: CPT

## 2024-10-08 ENCOUNTER — APPOINTMENT (OUTPATIENT)
Dept: INFUSION THERAPY | Facility: CLINIC | Age: 39
End: 2024-10-08

## 2024-10-08 ENCOUNTER — APPOINTMENT (OUTPATIENT)
Dept: HEMATOLOGY ONCOLOGY | Facility: CLINIC | Age: 39
End: 2024-10-08

## 2024-10-08 DIAGNOSIS — D64.9 ANEMIA, UNSPECIFIED: ICD-10-CM

## 2024-10-08 PROCEDURE — 99442: CPT

## 2024-10-14 ENCOUNTER — APPOINTMENT (OUTPATIENT)
Dept: OBGYN | Facility: CLINIC | Age: 39
End: 2024-10-14

## 2024-10-14 VITALS
SYSTOLIC BLOOD PRESSURE: 120 MMHG | BODY MASS INDEX: 28.43 KG/M2 | DIASTOLIC BLOOD PRESSURE: 80 MMHG | WEIGHT: 187 LBS | OXYGEN SATURATION: 97 %

## 2024-10-14 PROCEDURE — 0502F SUBSEQUENT PRENATAL CARE: CPT

## 2024-10-15 ENCOUNTER — OUTPATIENT (OUTPATIENT)
Dept: OUTPATIENT SERVICES | Facility: HOSPITAL | Age: 39
LOS: 1 days | End: 2024-10-15
Payer: COMMERCIAL

## 2024-10-15 ENCOUNTER — APPOINTMENT (OUTPATIENT)
Dept: INFUSION THERAPY | Facility: CLINIC | Age: 39
End: 2024-10-15

## 2024-10-15 VITALS
OXYGEN SATURATION: 97 % | RESPIRATION RATE: 18 BRPM | DIASTOLIC BLOOD PRESSURE: 74 MMHG | HEART RATE: 74 BPM | TEMPERATURE: 98 F | SYSTOLIC BLOOD PRESSURE: 131 MMHG

## 2024-10-15 VITALS
WEIGHT: 186.07 LBS | RESPIRATION RATE: 17 BRPM | DIASTOLIC BLOOD PRESSURE: 69 MMHG | HEART RATE: 75 BPM | SYSTOLIC BLOOD PRESSURE: 119 MMHG | OXYGEN SATURATION: 99 % | TEMPERATURE: 98 F

## 2024-10-15 DIAGNOSIS — D64.9 ANEMIA, UNSPECIFIED: ICD-10-CM

## 2024-10-15 PROCEDURE — 96365 THER/PROPH/DIAG IV INF INIT: CPT

## 2024-10-15 RX ORDER — IRON SUCROSE 20 MG/ML
200 INJECTION, SOLUTION INTRAVENOUS ONCE
Refills: 0 | Status: COMPLETED | OUTPATIENT
Start: 2024-10-15 | End: 2024-10-15

## 2024-10-15 RX ADMIN — IRON SUCROSE 200 MILLIGRAM(S): 20 INJECTION, SOLUTION INTRAVENOUS at 16:25

## 2024-10-15 RX ADMIN — IRON SUCROSE 220 MILLIGRAM(S): 20 INJECTION, SOLUTION INTRAVENOUS at 15:55

## 2024-10-22 ENCOUNTER — APPOINTMENT (OUTPATIENT)
Dept: INFUSION THERAPY | Facility: CLINIC | Age: 39
End: 2024-10-22

## 2024-10-22 ENCOUNTER — OUTPATIENT (OUTPATIENT)
Dept: OUTPATIENT SERVICES | Facility: HOSPITAL | Age: 39
LOS: 1 days | End: 2024-10-22
Payer: COMMERCIAL

## 2024-10-22 VITALS
TEMPERATURE: 98 F | HEART RATE: 78 BPM | OXYGEN SATURATION: 99 % | DIASTOLIC BLOOD PRESSURE: 74 MMHG | SYSTOLIC BLOOD PRESSURE: 112 MMHG | RESPIRATION RATE: 18 BRPM

## 2024-10-22 DIAGNOSIS — D64.9 ANEMIA, UNSPECIFIED: ICD-10-CM

## 2024-10-22 PROCEDURE — 96365 THER/PROPH/DIAG IV INF INIT: CPT

## 2024-10-22 RX ORDER — IRON SUCROSE 20 MG/ML
200 INJECTION, SOLUTION INTRAVENOUS ONCE
Refills: 0 | Status: COMPLETED | OUTPATIENT
Start: 2024-10-22 | End: 2024-10-22

## 2024-10-22 RX ADMIN — IRON SUCROSE 220 MILLIGRAM(S): 20 INJECTION, SOLUTION INTRAVENOUS at 15:40

## 2024-10-22 RX ADMIN — IRON SUCROSE 200 MILLIGRAM(S): 20 INJECTION, SOLUTION INTRAVENOUS at 16:10

## 2024-11-04 ENCOUNTER — APPOINTMENT (OUTPATIENT)
Dept: ANTEPARTUM | Facility: CLINIC | Age: 39
End: 2024-11-04

## 2024-11-05 ENCOUNTER — OUTPATIENT (OUTPATIENT)
Dept: OUTPATIENT SERVICES | Facility: HOSPITAL | Age: 39
LOS: 1 days | End: 2024-11-05
Payer: COMMERCIAL

## 2024-11-05 ENCOUNTER — APPOINTMENT (OUTPATIENT)
Dept: INFUSION THERAPY | Facility: CLINIC | Age: 39
End: 2024-11-05

## 2024-11-05 VITALS
HEART RATE: 82 BPM | DIASTOLIC BLOOD PRESSURE: 76 MMHG | HEIGHT: 69 IN | TEMPERATURE: 98 F | SYSTOLIC BLOOD PRESSURE: 129 MMHG | OXYGEN SATURATION: 98 % | RESPIRATION RATE: 20 BRPM | WEIGHT: 190.7 LBS

## 2024-11-05 VITALS
HEART RATE: 77 BPM | OXYGEN SATURATION: 99 % | DIASTOLIC BLOOD PRESSURE: 77 MMHG | RESPIRATION RATE: 20 BRPM | SYSTOLIC BLOOD PRESSURE: 126 MMHG | TEMPERATURE: 98 F

## 2024-11-05 DIAGNOSIS — D64.9 ANEMIA, UNSPECIFIED: ICD-10-CM

## 2024-11-05 PROCEDURE — 96365 THER/PROPH/DIAG IV INF INIT: CPT

## 2024-11-05 RX ORDER — IRON SUCROSE 20 MG/ML
200 INJECTION, SOLUTION INTRAVENOUS ONCE
Refills: 0 | Status: COMPLETED | OUTPATIENT
Start: 2024-11-05 | End: 2024-11-05

## 2024-11-05 RX ADMIN — IRON SUCROSE 200 MILLIGRAM(S): 20 INJECTION, SOLUTION INTRAVENOUS at 16:30

## 2024-11-05 RX ADMIN — IRON SUCROSE 220 MILLIGRAM(S): 20 INJECTION, SOLUTION INTRAVENOUS at 15:52

## 2024-11-13 ENCOUNTER — APPOINTMENT (OUTPATIENT)
Dept: INFUSION THERAPY | Facility: CLINIC | Age: 39
End: 2024-11-13

## 2024-11-13 ENCOUNTER — OUTPATIENT (OUTPATIENT)
Dept: OUTPATIENT SERVICES | Facility: HOSPITAL | Age: 39
LOS: 1 days | End: 2024-11-13
Payer: COMMERCIAL

## 2024-11-13 VITALS
WEIGHT: 190.04 LBS | TEMPERATURE: 98 F | OXYGEN SATURATION: 99 % | HEART RATE: 78 BPM | DIASTOLIC BLOOD PRESSURE: 75 MMHG | HEIGHT: 69 IN | RESPIRATION RATE: 18 BRPM | SYSTOLIC BLOOD PRESSURE: 125 MMHG

## 2024-11-13 DIAGNOSIS — D64.9 ANEMIA, UNSPECIFIED: ICD-10-CM

## 2024-11-13 PROCEDURE — 96365 THER/PROPH/DIAG IV INF INIT: CPT

## 2024-11-13 RX ORDER — IRON SUCROSE 20 MG/ML
200 INJECTION, SOLUTION INTRAVENOUS ONCE
Refills: 0 | Status: COMPLETED | OUTPATIENT
Start: 2024-11-13 | End: 2024-11-13

## 2024-11-13 RX ADMIN — IRON SUCROSE 200 MILLIGRAM(S): 20 INJECTION, SOLUTION INTRAVENOUS at 16:22

## 2024-11-13 RX ADMIN — IRON SUCROSE 220 MILLIGRAM(S): 20 INJECTION, SOLUTION INTRAVENOUS at 15:50

## 2024-11-15 ENCOUNTER — APPOINTMENT (OUTPATIENT)
Dept: OBGYN | Facility: CLINIC | Age: 39
End: 2024-11-15

## 2024-11-20 ENCOUNTER — APPOINTMENT (OUTPATIENT)
Dept: INFUSION THERAPY | Facility: CLINIC | Age: 39
End: 2024-11-20

## 2024-11-21 ENCOUNTER — APPOINTMENT (OUTPATIENT)
Dept: HEART AND VASCULAR | Facility: CLINIC | Age: 39
End: 2024-11-21

## 2024-11-25 ENCOUNTER — APPOINTMENT (OUTPATIENT)
Dept: OBGYN | Facility: CLINIC | Age: 39
End: 2024-11-25
Payer: MEDICAID

## 2024-11-25 VITALS
HEART RATE: 91 BPM | BODY MASS INDEX: 29.35 KG/M2 | DIASTOLIC BLOOD PRESSURE: 75 MMHG | WEIGHT: 193 LBS | OXYGEN SATURATION: 97 % | SYSTOLIC BLOOD PRESSURE: 130 MMHG

## 2024-11-25 PROCEDURE — 0502F SUBSEQUENT PRENATAL CARE: CPT

## 2024-11-26 LAB
GLUCOSE 1H P 50 G GLC PO SERPL-MCNC: 120 MG/DL
HCT VFR BLD CALC: 36.2 %
HGB BLD-MCNC: 11.4 G/DL
MCHC RBC-ENTMCNC: 30.5 PG
MCHC RBC-ENTMCNC: 31.5 G/DL
MCV RBC AUTO: 96.8 FL
PLATELET # BLD AUTO: 119 K/UL
RBC # BLD: 3.74 M/UL
RBC # FLD: 19.3 %
WBC # FLD AUTO: 6.13 K/UL

## 2024-11-27 ENCOUNTER — APPOINTMENT (OUTPATIENT)
Dept: INFUSION THERAPY | Facility: CLINIC | Age: 39
End: 2024-11-27

## 2024-11-27 ENCOUNTER — OUTPATIENT (OUTPATIENT)
Dept: OUTPATIENT SERVICES | Facility: HOSPITAL | Age: 39
LOS: 1 days | End: 2024-11-27

## 2024-11-27 DIAGNOSIS — D64.9 ANEMIA, UNSPECIFIED: ICD-10-CM

## 2024-12-02 LAB
BACTERIA UR CULT: NORMAL
RPR SER-TITR: NORMAL

## 2024-12-18 ENCOUNTER — NON-APPOINTMENT (OUTPATIENT)
Age: 39
End: 2024-12-18

## 2024-12-30 ENCOUNTER — NON-APPOINTMENT (OUTPATIENT)
Age: 39
End: 2024-12-30

## 2024-12-30 ENCOUNTER — APPOINTMENT (OUTPATIENT)
Dept: OBGYN | Facility: CLINIC | Age: 39
End: 2024-12-30
Payer: MEDICAID

## 2024-12-30 VITALS
SYSTOLIC BLOOD PRESSURE: 137 MMHG | OXYGEN SATURATION: 97 % | BODY MASS INDEX: 30.41 KG/M2 | WEIGHT: 200 LBS | HEART RATE: 97 BPM | DIASTOLIC BLOOD PRESSURE: 89 MMHG

## 2024-12-30 PROCEDURE — 0502F SUBSEQUENT PRENATAL CARE: CPT

## 2024-12-30 RX ORDER — HUMAN RHO(D) IMMUNE GLOBULIN 300 UG/1
1500 INJECTION, SOLUTION INTRAMUSCULAR
Qty: 1 | Refills: 0 | Status: DISCONTINUED | COMMUNITY
Start: 2024-12-30 | End: 2024-12-30

## 2025-01-02 ENCOUNTER — APPOINTMENT (OUTPATIENT)
Dept: HEART AND VASCULAR | Facility: CLINIC | Age: 40
End: 2025-01-02

## 2025-01-15 ENCOUNTER — OUTPATIENT (OUTPATIENT)
Dept: OUTPATIENT SERVICES | Facility: HOSPITAL | Age: 40
LOS: 1 days | End: 2025-01-15
Payer: COMMERCIAL

## 2025-01-15 ENCOUNTER — RESULT REVIEW (OUTPATIENT)
Age: 40
End: 2025-01-15

## 2025-01-15 DIAGNOSIS — R00.2 PALPITATIONS: ICD-10-CM

## 2025-01-15 PROCEDURE — 93306 TTE W/DOPPLER COMPLETE: CPT

## 2025-01-15 PROCEDURE — 93306 TTE W/DOPPLER COMPLETE: CPT | Mod: 26

## 2025-01-21 ENCOUNTER — APPOINTMENT (OUTPATIENT)
Dept: OBGYN | Facility: CLINIC | Age: 40
End: 2025-01-21

## 2025-01-21 VITALS
BODY MASS INDEX: 31.93 KG/M2 | WEIGHT: 210 LBS | SYSTOLIC BLOOD PRESSURE: 120 MMHG | DIASTOLIC BLOOD PRESSURE: 70 MMHG | OXYGEN SATURATION: 98 %

## 2025-01-21 PROCEDURE — 0502F SUBSEQUENT PRENATAL CARE: CPT

## 2025-01-21 RX ORDER — HUMAN RHO(D) IMMUNE GLOBULIN 300 UG/1
1500 INJECTION, SOLUTION INTRAMUSCULAR
Qty: 1 | Refills: 0 | Status: ACTIVE | COMMUNITY
Start: 2025-01-21 | End: 1900-01-01

## 2025-01-21 RX ORDER — ALBUTEROL SULFATE 90 UG/1
108 (90 BASE) INHALANT RESPIRATORY (INHALATION)
Qty: 1 | Refills: 0 | Status: ACTIVE | COMMUNITY
Start: 2025-01-21 | End: 1900-01-01

## 2025-01-22 ENCOUNTER — NON-APPOINTMENT (OUTPATIENT)
Age: 40
End: 2025-01-22

## 2025-01-22 LAB
BASOPHILS # BLD AUTO: 0.04 K/UL
BASOPHILS NFR BLD AUTO: 0.4 %
EOSINOPHIL # BLD AUTO: 0.21 K/UL
EOSINOPHIL NFR BLD AUTO: 2.3 %
HCT VFR BLD CALC: 35.6 %
HGB BLD-MCNC: 11.9 G/DL
IMM GRANULOCYTES NFR BLD AUTO: 0.4 %
LYMPHOCYTES # BLD AUTO: 1.08 K/UL
LYMPHOCYTES NFR BLD AUTO: 11.9 %
MAN DIFF?: NORMAL
MCHC RBC-ENTMCNC: 31.1 PG
MCHC RBC-ENTMCNC: 33.4 G/DL
MCV RBC AUTO: 93 FL
MONOCYTES # BLD AUTO: 0.72 K/UL
MONOCYTES NFR BLD AUTO: 8 %
NEUTROPHILS # BLD AUTO: 6.95 K/UL
NEUTROPHILS NFR BLD AUTO: 77 %
PLATELET # BLD AUTO: 148 K/UL
RBC # BLD: 3.83 M/UL
RBC # FLD: 15 %
WBC # FLD AUTO: 9.04 K/UL

## 2025-01-28 ENCOUNTER — APPOINTMENT (OUTPATIENT)
Dept: ANTEPARTUM | Facility: CLINIC | Age: 40
End: 2025-01-28
Payer: MEDICAID

## 2025-01-28 ENCOUNTER — ASOB RESULT (OUTPATIENT)
Age: 40
End: 2025-01-28

## 2025-01-28 PROCEDURE — 76816 OB US FOLLOW-UP PER FETUS: CPT

## 2025-01-28 PROCEDURE — 76820 UMBILICAL ARTERY ECHO: CPT | Mod: 59

## 2025-01-28 PROCEDURE — 76818 FETAL BIOPHYS PROFILE W/NST: CPT | Mod: 59

## 2025-01-29 ENCOUNTER — NON-APPOINTMENT (OUTPATIENT)
Age: 40
End: 2025-01-29

## 2025-02-03 ENCOUNTER — APPOINTMENT (OUTPATIENT)
Dept: OBGYN | Facility: CLINIC | Age: 40
End: 2025-02-03
Payer: MEDICAID

## 2025-02-03 VITALS — SYSTOLIC BLOOD PRESSURE: 130 MMHG | DIASTOLIC BLOOD PRESSURE: 90 MMHG

## 2025-02-03 VITALS
SYSTOLIC BLOOD PRESSURE: 139 MMHG | DIASTOLIC BLOOD PRESSURE: 92 MMHG | OXYGEN SATURATION: 93 % | BODY MASS INDEX: 32.51 KG/M2 | WEIGHT: 213.84 LBS | HEART RATE: 83 BPM

## 2025-02-03 DIAGNOSIS — Z34.90 ENCOUNTER FOR SUPERVISION OF NORMAL PREGNANCY, UNSPECIFIED, UNSPECIFIED TRIMESTER: ICD-10-CM

## 2025-02-03 PROCEDURE — 0502F SUBSEQUENT PRENATAL CARE: CPT

## 2025-02-04 LAB
ALBUMIN SERPL ELPH-MCNC: 3.3 G/DL
ALP BLD-CCNC: 101 U/L
ALT SERPL-CCNC: 7 U/L
ANION GAP SERPL CALC-SCNC: 10 MMOL/L
AST SERPL-CCNC: 21 U/L
BASOPHILS # BLD AUTO: 0.02 K/UL
BASOPHILS NFR BLD AUTO: 0.4 %
BILIRUB SERPL-MCNC: 0.3 MG/DL
BUN SERPL-MCNC: 7 MG/DL
CALCIUM SERPL-MCNC: 8.4 MG/DL
CHLORIDE SERPL-SCNC: 106 MMOL/L
CO2 SERPL-SCNC: 22 MMOL/L
CREAT SERPL-MCNC: 0.55 MG/DL
CREAT SPEC-SCNC: 176 MG/DL
CREAT/PROT UR: 0.2 RATIO
EGFR: 119 ML/MIN/1.73M2
EOSINOPHIL # BLD AUTO: 0.08 K/UL
EOSINOPHIL NFR BLD AUTO: 1.6 %
GLUCOSE SERPL-MCNC: 86 MG/DL
HCT VFR BLD CALC: 35 %
HGB BLD-MCNC: 11.1 G/DL
HIV1+2 AB SPEC QL IA.RAPID: NONREACTIVE
IMM GRANULOCYTES NFR BLD AUTO: 0.8 %
LYMPHOCYTES # BLD AUTO: 0.77 K/UL
LYMPHOCYTES NFR BLD AUTO: 15.5 %
MAN DIFF?: NORMAL
MCHC RBC-ENTMCNC: 30.8 PG
MCHC RBC-ENTMCNC: 31.7 G/DL
MCV RBC AUTO: 97.2 FL
MONOCYTES # BLD AUTO: 0.46 K/UL
MONOCYTES NFR BLD AUTO: 9.3 %
NEUTROPHILS # BLD AUTO: 3.59 K/UL
NEUTROPHILS NFR BLD AUTO: 72.4 %
PLATELET # BLD AUTO: 125 K/UL
POTASSIUM SERPL-SCNC: 4 MMOL/L
PROT SERPL-MCNC: 5.7 G/DL
PROT UR-MCNC: 32 MG/DL
RBC # BLD: 3.6 M/UL
RBC # FLD: 15.7 %
SODIUM SERPL-SCNC: 138 MMOL/L
T PALLIDUM AB SER QL IA: NEGATIVE
URATE SERPL-MCNC: 3.6 MG/DL
WBC # FLD AUTO: 4.96 K/UL

## 2025-02-05 LAB
GP B STREP DNA SPEC QL NAA+PROBE: NOT DETECTED
HCV AB SER QL: NONREACTIVE
HCV S/CO RATIO: 0.15 S/CO
LDH SERPL-CCNC: 208 U/L
SOURCE GBS: NORMAL

## 2025-02-06 ENCOUNTER — NON-APPOINTMENT (OUTPATIENT)
Age: 40
End: 2025-02-06

## 2025-02-06 ENCOUNTER — OUTPATIENT (OUTPATIENT)
Dept: OUTPATIENT SERVICES | Facility: HOSPITAL | Age: 40
LOS: 1 days | End: 2025-02-06
Payer: COMMERCIAL

## 2025-02-06 ENCOUNTER — APPOINTMENT (OUTPATIENT)
Dept: HEART AND VASCULAR | Facility: CLINIC | Age: 40
End: 2025-02-06
Payer: MEDICAID

## 2025-02-06 VITALS
OXYGEN SATURATION: 98 % | SYSTOLIC BLOOD PRESSURE: 138 MMHG | HEART RATE: 83 BPM | WEIGHT: 213.84 LBS | BODY MASS INDEX: 32.41 KG/M2 | HEIGHT: 68 IN | DIASTOLIC BLOOD PRESSURE: 80 MMHG

## 2025-02-06 VITALS
SYSTOLIC BLOOD PRESSURE: 131 MMHG | RESPIRATION RATE: 17 BRPM | TEMPERATURE: 98 F | HEART RATE: 85 BPM | DIASTOLIC BLOOD PRESSURE: 67 MMHG

## 2025-02-06 DIAGNOSIS — Z90.3 ACQUIRED ABSENCE OF STOMACH [PART OF]: Chronic | ICD-10-CM

## 2025-02-06 DIAGNOSIS — Z98.891 HISTORY OF UTERINE SCAR FROM PREVIOUS SURGERY: Chronic | ICD-10-CM

## 2025-02-06 DIAGNOSIS — O26.899 OTHER SPECIFIED PREGNANCY RELATED CONDITIONS, UNSPECIFIED TRIMESTER: ICD-10-CM

## 2025-02-06 DIAGNOSIS — Z98.890 OTHER SPECIFIED POSTPROCEDURAL STATES: Chronic | ICD-10-CM

## 2025-02-06 LAB
ALBUMIN SERPL ELPH-MCNC: 3.3 G/DL — SIGNIFICANT CHANGE UP (ref 3.3–5)
ALP SERPL-CCNC: 107 U/L — SIGNIFICANT CHANGE UP (ref 40–120)
ALT FLD-CCNC: 7 U/L — LOW (ref 10–45)
ANION GAP SERPL CALC-SCNC: 12 MMOL/L — SIGNIFICANT CHANGE UP (ref 5–17)
AST SERPL-CCNC: 15 U/L — SIGNIFICANT CHANGE UP (ref 10–40)
BASOPHILS # BLD AUTO: 0.01 K/UL — SIGNIFICANT CHANGE UP (ref 0–0.2)
BASOPHILS NFR BLD AUTO: 0.2 % — SIGNIFICANT CHANGE UP (ref 0–2)
BILIRUB SERPL-MCNC: 0.2 MG/DL — SIGNIFICANT CHANGE UP (ref 0.2–1.2)
BLD GP AB SCN SERPL QL: NEGATIVE — SIGNIFICANT CHANGE UP
BUN SERPL-MCNC: 8 MG/DL — SIGNIFICANT CHANGE UP (ref 7–23)
CALCIUM SERPL-MCNC: 8.4 MG/DL — SIGNIFICANT CHANGE UP (ref 8.4–10.5)
CHLORIDE SERPL-SCNC: 108 MMOL/L — SIGNIFICANT CHANGE UP (ref 96–108)
CO2 SERPL-SCNC: 20 MMOL/L — LOW (ref 22–31)
CREAT ?TM UR-MCNC: 78 MG/DL — SIGNIFICANT CHANGE UP
CREAT SERPL-MCNC: 0.44 MG/DL — LOW (ref 0.5–1.3)
EGFR: 125 ML/MIN/1.73M2 — SIGNIFICANT CHANGE UP
EOSINOPHIL # BLD AUTO: 0.07 K/UL — SIGNIFICANT CHANGE UP (ref 0–0.5)
EOSINOPHIL NFR BLD AUTO: 1.1 % — SIGNIFICANT CHANGE UP (ref 0–6)
FIBRINOGEN PPP-MCNC: 489 MG/DL — HIGH (ref 200–445)
GLUCOSE SERPL-MCNC: 92 MG/DL — SIGNIFICANT CHANGE UP (ref 70–99)
HCT VFR BLD CALC: 32.4 % — LOW (ref 34.5–45)
HGB BLD-MCNC: 11.1 G/DL — LOW (ref 11.5–15.5)
IMM GRANULOCYTES NFR BLD AUTO: 0.8 % — SIGNIFICANT CHANGE UP (ref 0–0.9)
LDH SERPL L TO P-CCNC: 160 U/L — SIGNIFICANT CHANGE UP (ref 50–242)
LYMPHOCYTES # BLD AUTO: 0.69 K/UL — LOW (ref 1–3.3)
LYMPHOCYTES # BLD AUTO: 11.1 % — LOW (ref 13–44)
MCHC RBC-ENTMCNC: 31.9 PG — SIGNIFICANT CHANGE UP (ref 27–34)
MCHC RBC-ENTMCNC: 34.3 G/DL — SIGNIFICANT CHANGE UP (ref 32–36)
MCV RBC AUTO: 93.1 FL — SIGNIFICANT CHANGE UP (ref 80–100)
MONOCYTES # BLD AUTO: 0.59 K/UL — SIGNIFICANT CHANGE UP (ref 0–0.9)
MONOCYTES NFR BLD AUTO: 9.5 % — SIGNIFICANT CHANGE UP (ref 2–14)
NEUTROPHILS # BLD AUTO: 4.78 K/UL — SIGNIFICANT CHANGE UP (ref 1.8–7.4)
NEUTROPHILS NFR BLD AUTO: 77.3 % — HIGH (ref 43–77)
NRBC # BLD: 0 /100 WBCS — SIGNIFICANT CHANGE UP (ref 0–0)
NRBC BLD-RTO: 0 /100 WBCS — SIGNIFICANT CHANGE UP (ref 0–0)
PLATELET # BLD AUTO: 110 K/UL — LOW (ref 150–400)
POTASSIUM SERPL-MCNC: 3.8 MMOL/L — SIGNIFICANT CHANGE UP (ref 3.5–5.3)
POTASSIUM SERPL-SCNC: 3.8 MMOL/L — SIGNIFICANT CHANGE UP (ref 3.5–5.3)
PROT ?TM UR-MCNC: 12 MG/DL — SIGNIFICANT CHANGE UP (ref 0–12)
PROT SERPL-MCNC: 6 G/DL — SIGNIFICANT CHANGE UP (ref 6–8.3)
PROT/CREAT UR-RTO: 0.2 RATIO — SIGNIFICANT CHANGE UP (ref 0–0.2)
RBC # BLD: 3.48 M/UL — LOW (ref 3.8–5.2)
RBC # FLD: 15.3 % — HIGH (ref 10.3–14.5)
RH IG SCN BLD-IMP: NEGATIVE — SIGNIFICANT CHANGE UP
RH IG SCN BLD-IMP: NEGATIVE — SIGNIFICANT CHANGE UP
SODIUM SERPL-SCNC: 140 MMOL/L — SIGNIFICANT CHANGE UP (ref 135–145)
URATE SERPL-MCNC: 3.5 MG/DL — SIGNIFICANT CHANGE UP (ref 2.5–7)
WBC # BLD: 6.19 K/UL — SIGNIFICANT CHANGE UP (ref 3.8–10.5)
WBC # FLD AUTO: 6.19 K/UL — SIGNIFICANT CHANGE UP (ref 3.8–10.5)

## 2025-02-06 PROCEDURE — 86850 RBC ANTIBODY SCREEN: CPT

## 2025-02-06 PROCEDURE — 85384 FIBRINOGEN ACTIVITY: CPT

## 2025-02-06 PROCEDURE — 85025 COMPLETE CBC W/AUTO DIFF WBC: CPT

## 2025-02-06 PROCEDURE — 84550 ASSAY OF BLOOD/URIC ACID: CPT

## 2025-02-06 PROCEDURE — 80053 COMPREHEN METABOLIC PANEL: CPT

## 2025-02-06 PROCEDURE — 96372 THER/PROPH/DIAG INJ SC/IM: CPT

## 2025-02-06 PROCEDURE — 86901 BLOOD TYPING SEROLOGIC RH(D): CPT

## 2025-02-06 PROCEDURE — 83615 LACTATE (LD) (LDH) ENZYME: CPT

## 2025-02-06 PROCEDURE — 99214 OFFICE O/P EST MOD 30 MIN: CPT

## 2025-02-06 PROCEDURE — 82570 ASSAY OF URINE CREATININE: CPT

## 2025-02-06 PROCEDURE — 99215 OFFICE O/P EST HI 40 MIN: CPT

## 2025-02-06 PROCEDURE — 36415 COLL VENOUS BLD VENIPUNCTURE: CPT

## 2025-02-06 PROCEDURE — 86900 BLOOD TYPING SEROLOGIC ABO: CPT

## 2025-02-06 PROCEDURE — 84156 ASSAY OF PROTEIN URINE: CPT

## 2025-02-06 RX ORDER — ACETAMINOPHEN 160 MG/5ML
325 SUSPENSION ORAL ONCE
Refills: 0 | Status: DISCONTINUED | OUTPATIENT
Start: 2025-02-06 | End: 2025-02-06

## 2025-02-06 RX ORDER — METOCLOPRAMIDE 10 MG/1
10 TABLET ORAL ONCE
Refills: 0 | Status: COMPLETED | OUTPATIENT
Start: 2025-02-06 | End: 2025-02-06

## 2025-02-06 RX ORDER — ACETAMINOPHEN 160 MG/5ML
1000 SUSPENSION ORAL ONCE
Refills: 0 | Status: DISCONTINUED | OUTPATIENT
Start: 2025-02-06 | End: 2025-02-06

## 2025-02-06 RX ORDER — METOCLOPRAMIDE 10 MG/1
10 TABLET ORAL ONCE
Refills: 0 | Status: DISCONTINUED | OUTPATIENT
Start: 2025-02-06 | End: 2025-02-06

## 2025-02-06 RX ORDER — ACETAMINOPHEN 160 MG/5ML
975 SUSPENSION ORAL ONCE
Refills: 0 | Status: COMPLETED | OUTPATIENT
Start: 2025-02-06 | End: 2025-02-06

## 2025-02-06 RX ADMIN — METOCLOPRAMIDE 10 MILLIGRAM(S): 10 TABLET ORAL at 19:05

## 2025-02-06 RX ADMIN — ACETAMINOPHEN 975 MILLIGRAM(S): 160 SUSPENSION ORAL at 17:47

## 2025-02-06 NOTE — OB PROVIDER TRIAGE NOTE - HISTORY OF PRESENT ILLNESS
Patient is a 40yo  at 37w0d presenting for headache. Per patient, headache started at 10:00. She describes it as frontal and behind the left eye. 7/10 on a pain scale. Patient with no history of migraines. Did not take medications for the pain. She denies scotoma, CP, SOB, RUQ/epgiasric pain. Normotensive in triage. Patient reports a remote history of HTN that resolved with lifestyle changes prior to pregnancy. - LOF - VB - CTX +FM    Ante: Spontaneous pregnancy. NIPT and anatomy scan wnl. Passed GCT. Patient with elevated BPs in the office earlier this week. PEC labs at the time wnl. Patient with history of palpitations on metopralol and follows with Dr. Sheets. Recent echo with normal LVEF and severely dilated left atrium. PEr Dr. Sheets this finding is likely due to pregnancy and not pathological. Plan for postpartum echo.  EFW 2839  gm        GBS neg    OBHX:  G1 - 2020 CS, pregnancy c/b by PEC  GynHX: reports 1 fibroid noted to her in previous pregnancy. Denies ovarian cysts, abnormal pap smear, STI/herpes.   MedHX: Asthma, never hospitalized or intubated. Uses albuterol PRN. HTN prior to gastric sleeve in  and lifestyle changes,  R inguinal repair  Surghx: gastric sleeve in , CS   Medications: metopralol 12.5 BID, PNV, albuterol PRN,  QD  Allergies: seasonal    Physical Exam:  -132/62-81   HR 80-92    General: NAD  Pulm: no increased WOB  Abdomen: soft, gravid, nontender  Extremities: wnl   TAUS: Cephalic. Placenta anterior. MATTHEW 12. BPP 8/8.   VE: Deferred    EFM: [ ] bpm, mod variability, + accels, - decels; reactive and reassuring  [place]: no ctx        A/p:    - Fetal status is reassuring given BPP 8/8, appropriate MATTHEW, reactive and reassuring NST  - Ultrasound attached in chart  - Labor unlikely given no CTX on monitor, no pain, cervical exam of [ ]   - Discharged with strict return precautions       Patient is a 40yo  at 37w0d presenting for headache. Per patient, headache started at 10:00. She describes it as frontal and behind the left eye. 7/10 on a pain scale. Patient with no history of migraines. Did not take medications for the pain. She denies scotoma, CP, SOB, RUQ/epgiasric pain. Normotensive in triage. Patient reports a remote history of HTN that resolved with lifestyle changes prior to pregnancy. - LOF - VB - CTX +FM    Ante: Spontaneous pregnancy. NIPT and anatomy scan wnl. Passed GCT. Patient with elevated BPs in the office earlier this week. PEC labs at the time wnl. Patient with history of palpitations on metopralol and follows with Dr. Sheets. Recent echo with normal LVEF and severely dilated left atrium. PEr Dr. Sheets this finding is likely due to pregnancy and not pathological. Plan for postpartum echo. Pregnancy also complicated by gestational thrombocytopenia.  EFW 2839  gm        GBS neg    OBHX:  G1 -  CS, pregnancy c/b by PEC  GynHX: reports 1 fibroid noted to her in previous pregnancy. Denies ovarian cysts, abnormal pap smear, STI/herpes.   MedHX: Asthma, never hospitalized or intubated. Uses albuterol PRN. HTN prior to gastric sleeve in  and lifestyle changes,  R inguinal repair  Surghx: gastric sleeve in , CS   Medications: metopralol 12.5 BID, PNV, albuterol PRN,  QD  Allergies: seasonal    Physical Exam:  -132/62-81   HR 80-92    General: NAD  Pulm: no increased WOB  Abdomen: soft, gravid, nontender  Extremities: wnl   TAUS: Cephalic. Placenta anterior. MATTHEW 12. BPP 8/8.   VE: Deferred    EFM: [ ] bpm, mod variability, + accels, - decels; reactive and reassuring  [place]: no ctx        A/p:    - Fetal status is reassuring given BPP 8/8, appropriate MATTHEW, reactive and reassuring NST  - Ultrasound attached in chart  - Labor unlikely given no CTX on monitor, no pain, cervical exam of [ ]   - Discharged with strict return precautions       Patient is a 38yo  at 37w0d presenting for headache. Per patient, headache started at 10:00. She describes it as frontal and behind the left eye. 7/10 on a pain scale. Patient with no history of migraines. Did not take medications for the pain. She denies scotoma, CP, SOB, RUQ/epgiasric pain. Normotensive in triage. Patient reports a remote history of HTN that resolved with lifestyle changes prior to pregnancy. - LOF - VB - CTX +FM    Ante: Spontaneous pregnancy. NIPT and anatomy scan wnl. Passed GCT. Patient with elevated BPs in the office earlier this week. PEC labs at the time wnl. Patient with history of palpitations on metopralol and follows with Dr. Sheets. Recent echo with normal LVEF and severely dilated left atrium. Per Dr. Sheets this finding is likely due to pregnancy and not pathological. Plan for postpartum echo. Pregnancy also complicated by gestational thrombocytopenia.  EFW 2839  gm        GBS neg    OBHX:  G1 -  CS, pregnancy c/b by PEC  GynHX: reports 1 fibroid noted to her in previous pregnancy (not found on BH scans). Denies ovarian cysts, abnormal pap smear, STI/herpes.   MedHX: Asthma, never hospitalized or intubated. Uses albuterol PRN. HTN prior to gastric sleeve in  and lifestyle changes,  R inguinal repair  Surghx: gastric sleeve in , CS   Medications: metopralol 12.5 BID, PNV, albuterol PRN,  QD  Allergies: seasonal    Physical Exam:  -132/62-81   HR 80-92    General: NAD  Pulm: no increased WOB  Abdomen: soft, gravid, nontender  Extremities: wnl   TAUS: Cephalic. Placenta anterior. MATTHEW 12. BPP 8/8.   VE: Deferred    EFM: 130 bpm, mod variability, + accels, - decels; reactive and reassuring  [place]: no ctx      A/p: 38yo  at 37w0d presenting for headache.    - Patient with mild range BP in triage, meets criteria for gHTN  - Headache resolved with tylenol and reglan  - PEC labs significant for platelets 110. Patient with known history of gestational thrombocytopenia ( 119-> 148-> 2/3 125). Labs otherwise wnl, p/c 0.2.   - Fetal status is reassuring given BPP 8/8, appropriate MATTHEW, reactive and reassuring NST  - Patient's blood type is O negative. Plan for Rhogam today  - Pending discussion with patient about repeat CS today given gHTN and gestational age    Discussed with Dr. Gisel Edouard PGY3 and attending Dr. Kathya Maradiaga PGY1         Patient is a 40yo  at 37w0d presenting for headache. Per patient, headache started at 10:00. She describes it as frontal and behind the left eye. 7/10 on a pain scale. Patient with no history of migraines. Did not take medications for the pain. She denies scotoma, CP, SOB, RUQ/epgiasric pain. Normotensive in triage. Patient reports a remote history of HTN that resolved with lifestyle changes prior to pregnancy. - LOF - VB - CTX +FM    Ante: Spontaneous pregnancy. NIPT and anatomy scan wnl. Passed GCT. Patient with elevated BPs in the office earlier this week. PEC labs at the time wnl. Patient with history of palpitations on metopralol and follows with Dr. Sheets. Recent echo with normal LVEF and severely dilated left atrium. Per Dr. Sheets this finding is likely due to pregnancy and not pathological. Plan for postpartum echo. Pregnancy also complicated by gestational thrombocytopenia.  EFW 2839  gm        GBS neg    OBHX:  G1 -  CS, pregnancy c/b by PEC  GynHX: reports 1 fibroid noted to her in previous pregnancy (not found on BH scans). Denies ovarian cysts, abnormal pap smear, STI/herpes.   MedHX: Asthma, never hospitalized or intubated. Uses albuterol PRN. HTN prior to gastric sleeve in  and lifestyle changes,  R inguinal repair  Surghx: gastric sleeve in , CS   Medications: metopralol 12.5 BID, PNV, albuterol PRN,  QD  Allergies: seasonal    Physical Exam:  -142/62-83   HR 80-98    General: NAD  Pulm: no increased WOB  Abdomen: soft, gravid, nontender  Extremities: wnl   TAUS: Cephalic. Placenta anterior. MATTHEW 12. BPP 8/8.   VE: Deferred    EFM: 130 bpm, mod variability, + accels, - decels; reactive and reassuring  [place]: no ctx      A/p: 40yo  at 37w0d presenting for headache.    - Patient with mild range BP in triage, meets criteria for gHTN  - Headache resolved with tylenol and reglan  - PEC labs significant for platelets 110. Patient with known history of gestational thrombocytopenia ( 119-> 148-> 2/3 125). Labs otherwise wnl, p/c 0.2.   - Fetal status is reassuring given BPP 8/8, appropriate MATTHEW, reactive and reassuring NST  - Patient's blood type is O negative. Plan for Rhogam today  - Pending discussion with patient about repeat CS today given gHTN and gestational age    Discussed with Dr. Gisel Edouard PGY3 and attending Dr. Kathya Maradiaga PGY1         Patient is a 40yo  at 37w0d presenting for headache. Per patient, headache started at 10:00. She describes it as frontal and behind the left eye. 7/10 on a pain scale. Patient with no history of migraines. Did not take medications for the pain. She denies scotoma, CP, SOB, RUQ/epgiasric pain. Normotensive in triage. Patient reports a remote history of HTN that resolved with lifestyle changes prior to pregnancy. - LOF - VB - CTX +FM    Ante: Spontaneous pregnancy. NIPT and anatomy scan wnl. Passed GCT. Patient with elevated BPs in the office earlier this week. PEC labs at the time wnl. Patient with history of palpitations on metopralol and follows with Dr. Sheets. Recent echo with normal LVEF and severely dilated left atrium. Per Dr. Sheets this finding is likely due to pregnancy and not pathological. Plan for postpartum echo. Pregnancy also complicated by gestational thrombocytopenia.  EFW 2839  gm        GBS neg    OBHX:  G1 -  CS, pregnancy c/b by PEC  GynHX: reports 1 fibroid noted to her in previous pregnancy (not found on BH scans). Denies ovarian cysts, abnormal pap smear, STI/herpes.   MedHX: Asthma, never hospitalized or intubated. Uses albuterol PRN. HTN prior to gastric sleeve in  and lifestyle changes,  R inguinal repair  Surghx: gastric sleeve in , CS   Medications: metopralol 12.5 BID, PNV, albuterol PRN,  QD  Allergies: seasonal    Physical Exam:  -142/62-83   HR 80-98    General: NAD  Pulm: no increased WOB  Abdomen: soft, gravid, nontender  Extremities: wnl   TAUS: Cephalic. Placenta anterior. MATTHEW 12. BPP 8/8.   VE: Deferred    EFM: 130 bpm, mod variability, + accels, - decels; reactive and reassuring  [place]: no ctx      A/p: 40yo  at 37w0d presenting for headache.    - Patient with mild range BP in triage, with history of cHTN. Normotensive prior to this week in pregnancy, unmedicated.   - Headache resolved with tylenol and reglan  - PEC labs significant for platelets 110. Patient with known history of gestational thrombocytopenia ( 119-> 148-> 2/3 125). Labs otherwise wnl, p/c 0.2.   - Fetal status is reassuring given BPP 8/8, appropriate MATTHEW, reactive and reassuring NST  - Patient's blood type is O negative. Plan for Rhogam today  - Pending discussion with patient about repeat CS today given exacerbation of cHTN versus early stage of superimposed PEC    Discussed with Dr. Gisel Edouard PGY3 and attending Dr. Kathya Maradiaga PGY1         Patient is a 40yo  at 37w0d presenting for headache. Per patient, headache started at 10:00. She describes it as frontal and behind the left eye. 7/10 on a pain scale. Patient with no history of migraines. Did not take medications for the pain. She denies scotoma, CP, SOB, RUQ/epgiasric pain. Normotensive in triage. Patient reports a remote history of HTN that resolved with lifestyle changes prior to pregnancy. - LOF - VB - CTX +FM    Ante: Spontaneous pregnancy. NIPT and anatomy scan wnl. Passed GCT. Patient with elevated BPs in the office earlier this week. PEC labs at the time wnl. Patient with history of palpitations on metopralol and follows with Dr. Sheets. Recent echo with normal LVEF and severely dilated left atrium. Per Dr. Sheets this finding is likely due to pregnancy and not pathological. Plan for postpartum echo. Pregnancy also complicated by gestational thrombocytopenia.  EFW 2839  gm        GBS neg    OBHX:  G1 -  CS, pregnancy c/b by PEC  GynHX: reports 1 fibroid noted to her in previous pregnancy (not found on BH scans). Denies ovarian cysts, abnormal pap smear, STI/herpes.   MedHX: Asthma, never hospitalized or intubated. Uses albuterol PRN. HTN prior to gastric sleeve in  and lifestyle changes,  R inguinal repair  Surghx: gastric sleeve in , CS   Medications: metopralol 12.5 BID, PNV, albuterol PRN,  QD  Allergies: seasonal    Physical Exam:  -142/62-83   HR 80-98    General: NAD  Pulm: no increased WOB  Abdomen: soft, gravid, nontender  Extremities: wnl   TAUS: Cephalic. Placenta anterior. MATTHEW 12. BPP 8/8.   VE: Deferred    EFM: 130 bpm, mod variability, + accels, - decels; reactive and reassuring  [place]: no ctx      A/p: 40yo  at 37w0d presenting for headache.    - Patient with mild range BP in triage, with history of cHTN. Normotensive prior to this week in pregnancy, unmedicated.   - Headache resolved with tylenol and reglan  - PEC labs significant for platelets 110. Patient with known history of gestational thrombocytopenia ( 119-> 148-> 2/3 125). Labs otherwise wnl, p/c 0.2.   - Fetal status is reassuring given BPP 8/8, appropriate MATTHEW, reactive and reassuring NST  - Patient's blood type is O negative. Plan for Rhogam today  - Pending discussion with patient about repeat CS today given exacerbation of cHTN versus early stage of superimposed PEC    Discussed with Dr. Gisel Edouard PGY3 and attending Dr. Kathya Maradiaga PGY1    Addendum  Patient seen at bedside. Headache resolved, PEC labs WNL.  Due to cHTN diagnosis and normal to mild range BPs here today, patient is for 38wk delivery.  she has f/u in office on Monday  She received rhogam today  she will continue to monitor BPs at home and was told to call if any uptrending BPs, or symptoms of PEC  Will schedule for c section at 38wks    [place] PGY3 discussed with Dr Rasheed

## 2025-02-06 NOTE — OB RN TRIAGE NOTE - FALL HARM RISK - UNIVERSAL INTERVENTIONS
Bed in lowest position, wheels locked, appropriate side rails in place/Call bell, personal items and telephone in reach/Instruct patient to call for assistance before getting out of bed or chair/Non-slip footwear when patient is out of bed/[place] to call system/Physically safe environment - no spills, clutter or unnecessary equipment/Purposeful Proactive Rounding/Room/bathroom lighting operational, light cord in reach

## 2025-02-10 ENCOUNTER — LABORATORY RESULT (OUTPATIENT)
Age: 40
End: 2025-02-10

## 2025-02-10 ENCOUNTER — APPOINTMENT (OUTPATIENT)
Dept: OBGYN | Facility: CLINIC | Age: 40
End: 2025-02-10
Payer: MEDICAID

## 2025-02-10 VITALS
DIASTOLIC BLOOD PRESSURE: 83 MMHG | HEART RATE: 85 BPM | SYSTOLIC BLOOD PRESSURE: 125 MMHG | BODY MASS INDEX: 32.69 KG/M2 | OXYGEN SATURATION: 98 % | WEIGHT: 215 LBS

## 2025-02-10 DIAGNOSIS — O34.219 MATERNAL CARE FOR UNSPECIFIED TYPE SCAR FROM PREVIOUS CESAREAN DELIVERY: ICD-10-CM

## 2025-02-10 DIAGNOSIS — Z3A.37 37 WEEKS GESTATION OF PREGNANCY: ICD-10-CM

## 2025-02-10 DIAGNOSIS — Z87.59 PERSONAL HISTORY OF OTHER COMPLICATIONS OF PREGNANCY, CHILDBIRTH AND THE PUERPERIUM: ICD-10-CM

## 2025-02-10 DIAGNOSIS — O99.113 OTHER DISEASES OF THE BLOOD AND BLOOD-FORMING ORGANS AND CERTAIN DISORDERS INVOLVING THE IMMUNE MECHANISM COMPLICATING PREGNANCY, THIRD TRIMESTER: ICD-10-CM

## 2025-02-10 DIAGNOSIS — D69.6 THROMBOCYTOPENIA, UNSPECIFIED: ICD-10-CM

## 2025-02-10 DIAGNOSIS — O09.523 SUPERVISION OF ELDERLY MULTIGRAVIDA, THIRD TRIMESTER: ICD-10-CM

## 2025-02-10 DIAGNOSIS — Z67.40 TYPE O BLOOD, RH POSITIVE: ICD-10-CM

## 2025-02-10 PROCEDURE — 0502F SUBSEQUENT PRENATAL CARE: CPT

## 2025-02-11 ENCOUNTER — APPOINTMENT (OUTPATIENT)
Dept: ANTEPARTUM | Facility: CLINIC | Age: 40
End: 2025-02-11
Payer: MEDICAID

## 2025-02-11 ENCOUNTER — ASOB RESULT (OUTPATIENT)
Age: 40
End: 2025-02-11

## 2025-02-11 PROBLEM — J45.909 UNSPECIFIED ASTHMA, UNCOMPLICATED: Chronic | Status: ACTIVE | Noted: 2025-02-06

## 2025-02-11 PROBLEM — Z86.79 PERSONAL HISTORY OF OTHER DISEASES OF THE CIRCULATORY SYSTEM: Chronic | Status: ACTIVE | Noted: 2025-02-06

## 2025-02-11 PROCEDURE — 76816 OB US FOLLOW-UP PER FETUS: CPT

## 2025-02-11 PROCEDURE — 76820 UMBILICAL ARTERY ECHO: CPT | Mod: 59

## 2025-02-11 PROCEDURE — 76818 FETAL BIOPHYS PROFILE W/NST: CPT

## 2025-02-14 ENCOUNTER — INPATIENT (INPATIENT)
Facility: HOSPITAL | Age: 40
LOS: 3 days | Discharge: HOME | End: 2025-02-18
Attending: OBSTETRICS & GYNECOLOGY | Admitting: OBSTETRICS & GYNECOLOGY
Payer: COMMERCIAL

## 2025-02-14 VITALS
TEMPERATURE: 98 F | HEIGHT: 68 IN | WEIGHT: 210.1 LBS | SYSTOLIC BLOOD PRESSURE: 139 MMHG | OXYGEN SATURATION: 100 % | HEART RATE: 78 BPM | DIASTOLIC BLOOD PRESSURE: 73 MMHG | RESPIRATION RATE: 17 BRPM

## 2025-02-14 DIAGNOSIS — Z98.890 OTHER SPECIFIED POSTPROCEDURAL STATES: Chronic | ICD-10-CM

## 2025-02-14 DIAGNOSIS — Z98.891 HISTORY OF UTERINE SCAR FROM PREVIOUS SURGERY: Chronic | ICD-10-CM

## 2025-02-14 DIAGNOSIS — Z90.3 ACQUIRED ABSENCE OF STOMACH [PART OF]: Chronic | ICD-10-CM

## 2025-02-14 LAB
ALBUMIN SERPL ELPH-MCNC: 3.3 G/DL — SIGNIFICANT CHANGE UP (ref 3.3–5)
ALP SERPL-CCNC: 100 U/L — SIGNIFICANT CHANGE UP (ref 40–120)
ALT FLD-CCNC: 5 U/L — LOW (ref 10–45)
ANION GAP SERPL CALC-SCNC: 8 MMOL/L — SIGNIFICANT CHANGE UP (ref 5–17)
AST SERPL-CCNC: 13 U/L — SIGNIFICANT CHANGE UP (ref 10–40)
BASOPHILS # BLD AUTO: 0.03 K/UL — SIGNIFICANT CHANGE UP (ref 0–0.2)
BASOPHILS NFR BLD AUTO: 0.4 % — SIGNIFICANT CHANGE UP (ref 0–2)
BILIRUB SERPL-MCNC: 0.3 MG/DL — SIGNIFICANT CHANGE UP (ref 0.2–1.2)
BLD GP AB SCN SERPL QL: POSITIVE — SIGNIFICANT CHANGE UP
BUN SERPL-MCNC: 12 MG/DL — SIGNIFICANT CHANGE UP (ref 7–23)
CALCIUM SERPL-MCNC: 8.6 MG/DL — SIGNIFICANT CHANGE UP (ref 8.4–10.5)
CHLORIDE SERPL-SCNC: 106 MMOL/L — SIGNIFICANT CHANGE UP (ref 96–108)
CO2 SERPL-SCNC: 23 MMOL/L — SIGNIFICANT CHANGE UP (ref 22–31)
CREAT ?TM UR-MCNC: 96 MG/DL — SIGNIFICANT CHANGE UP
CREAT SERPL-MCNC: 0.45 MG/DL — LOW (ref 0.5–1.3)
EGFR: 125 ML/MIN/1.73M2 — SIGNIFICANT CHANGE UP
EOSINOPHIL # BLD AUTO: 0.1 K/UL — SIGNIFICANT CHANGE UP (ref 0–0.5)
EOSINOPHIL NFR BLD AUTO: 1.2 % — SIGNIFICANT CHANGE UP (ref 0–6)
FIBRINOGEN PPP-MCNC: 467 MG/DL — HIGH (ref 200–445)
GLUCOSE SERPL-MCNC: 86 MG/DL — SIGNIFICANT CHANGE UP (ref 70–99)
HCT VFR BLD CALC: 31.5 % — LOW (ref 34.5–45)
HGB BLD-MCNC: 10.7 G/DL — LOW (ref 11.5–15.5)
IMM GRANULOCYTES NFR BLD AUTO: 0.7 % — SIGNIFICANT CHANGE UP (ref 0–0.9)
LDH SERPL L TO P-CCNC: 146 U/L — SIGNIFICANT CHANGE UP (ref 50–242)
LYMPHOCYTES # BLD AUTO: 0.85 K/UL — LOW (ref 1–3.3)
LYMPHOCYTES # BLD AUTO: 10.5 % — LOW (ref 13–44)
MCHC RBC-ENTMCNC: 31.8 PG — SIGNIFICANT CHANGE UP (ref 27–34)
MCHC RBC-ENTMCNC: 34 G/DL — SIGNIFICANT CHANGE UP (ref 32–36)
MCV RBC AUTO: 93.8 FL — SIGNIFICANT CHANGE UP (ref 80–100)
MONOCYTES # BLD AUTO: 0.62 K/UL — SIGNIFICANT CHANGE UP (ref 0–0.9)
MONOCYTES NFR BLD AUTO: 7.6 % — SIGNIFICANT CHANGE UP (ref 2–14)
NEUTROPHILS # BLD AUTO: 6.45 K/UL — SIGNIFICANT CHANGE UP (ref 1.8–7.4)
NEUTROPHILS NFR BLD AUTO: 79.6 % — HIGH (ref 43–77)
NRBC BLD AUTO-RTO: 0 /100 WBCS — SIGNIFICANT CHANGE UP (ref 0–0)
PLATELET # BLD AUTO: 144 K/UL — LOW (ref 150–400)
POTASSIUM SERPL-MCNC: 3.7 MMOL/L — SIGNIFICANT CHANGE UP (ref 3.5–5.3)
POTASSIUM SERPL-SCNC: 3.7 MMOL/L — SIGNIFICANT CHANGE UP (ref 3.5–5.3)
PROT ?TM UR-MCNC: 34 MG/DL — HIGH (ref 0–12)
PROT SERPL-MCNC: 6.3 G/DL — SIGNIFICANT CHANGE UP (ref 6–8.3)
PROT/CREAT UR-RTO: 0.4 RATIO — HIGH (ref 0–0.2)
RBC # BLD: 3.36 M/UL — LOW (ref 3.8–5.2)
RBC # FLD: 14.6 % — HIGH (ref 10.3–14.5)
RH IG SCN BLD-IMP: NEGATIVE — SIGNIFICANT CHANGE UP
SODIUM SERPL-SCNC: 137 MMOL/L — SIGNIFICANT CHANGE UP (ref 135–145)
URATE SERPL-MCNC: 3.3 MG/DL — SIGNIFICANT CHANGE UP (ref 2.5–7)
WBC # BLD: 8.11 K/UL — SIGNIFICANT CHANGE UP (ref 3.8–10.5)
WBC # FLD AUTO: 8.11 K/UL — SIGNIFICANT CHANGE UP (ref 3.8–10.5)

## 2025-02-14 PROCEDURE — 59510 CESAREAN DELIVERY: CPT | Mod: U7,59

## 2025-02-14 PROCEDURE — 58300 INSERT INTRAUTERINE DEVICE: CPT

## 2025-02-14 RX ORDER — LEVONORGESTREL 1.5 MG/1
52 TABLET ORAL ONCE
Refills: 0 | Status: DISCONTINUED | OUTPATIENT
Start: 2025-02-14 | End: 2025-02-14

## 2025-02-14 RX ORDER — KETOROLAC TROMETHAMINE 30 MG/ML
30 INJECTION, SOLUTION INTRAMUSCULAR; INTRAVENOUS EVERY 6 HOURS
Refills: 0 | Status: DISCONTINUED | OUTPATIENT
Start: 2025-02-14 | End: 2025-02-15

## 2025-02-14 RX ORDER — CEFAZOLIN SODIUM IN 0.9 % NACL 3 G/100 ML
2000 INTRAVENOUS SOLUTION, PIGGYBACK (ML) INTRAVENOUS ONCE
Refills: 0 | Status: COMPLETED | OUTPATIENT
Start: 2025-02-14 | End: 2025-02-14

## 2025-02-14 RX ORDER — DEXTROMETHORPHAN HBR, GUAIFENESIN 200 MG/10ML
100 LIQUID ORAL EVERY 12 HOURS
Refills: 0 | Status: DISCONTINUED | OUTPATIENT
Start: 2025-02-14 | End: 2025-02-18

## 2025-02-14 RX ORDER — METOPROLOL SUCCINATE 50 MG/1
12.5 TABLET, EXTENDED RELEASE ORAL EVERY 12 HOURS
Refills: 0 | Status: DISCONTINUED | OUTPATIENT
Start: 2025-02-14 | End: 2025-02-18

## 2025-02-14 RX ORDER — OXYTOCIN-SODIUM CHLORIDE 0.9% IV SOLN 30 UNIT/500ML 30-0.9/5 UT/ML-%
42 SOLUTION INTRAVENOUS
Qty: 30 | Refills: 0 | Status: DISCONTINUED | OUTPATIENT
Start: 2025-02-14 | End: 2025-02-18

## 2025-02-14 RX ORDER — INFLUENZA A VIRUS A/IDAHO/07/2018 (H1N1) ANTIGEN (MDCK CELL DERIVED, PROPIOLACTONE INACTIVATED, INFLUENZA A VIRUS A/INDIANA/08/2018 (H3N2) ANTIGEN (MDCK CELL DERIVED, PROPIOLACTONE INACTIVATED), INFLUENZA B VIRUS B/SINGAPORE/INFTT-16-0610/2016 ANTIGEN (MDCK CELL DERIVED, PROPIOLACTONE INACTIVATED), INFLUENZA B VIRUS B/IOWA/06/2017 ANTIGEN (MDCK CELL DERIVED, PROPIOLACTONE INACTIVATED) 15; 15; 15; 15 UG/.5ML; UG/.5ML; UG/.5ML; UG/.5ML
0.5 INJECTION, SUSPENSION INTRAMUSCULAR ONCE
Refills: 0 | Status: DISCONTINUED | OUTPATIENT
Start: 2025-02-14 | End: 2025-02-14

## 2025-02-14 RX ORDER — ENOXAPARIN SODIUM 100 MG/ML
40 INJECTION SUBCUTANEOUS EVERY 24 HOURS
Refills: 0 | Status: DISCONTINUED | OUTPATIENT
Start: 2025-02-15 | End: 2025-02-18

## 2025-02-14 RX ORDER — DEXAMETHASONE 0.5 MG/1
4 TABLET ORAL EVERY 6 HOURS
Refills: 0 | Status: DISCONTINUED | OUTPATIENT
Start: 2025-02-14 | End: 2025-02-14

## 2025-02-14 RX ORDER — NALOXONE HYDROCHLORIDE 0.4 MG/ML
0.1 INJECTION, SOLUTION INTRAMUSCULAR; INTRAVENOUS; SUBCUTANEOUS
Refills: 0 | Status: DISCONTINUED | OUTPATIENT
Start: 2025-02-14 | End: 2025-02-14

## 2025-02-14 RX ORDER — MAGNESIUM HYDROXIDE 400 MG/5ML
30 SUSPENSION ORAL
Refills: 0 | Status: DISCONTINUED | OUTPATIENT
Start: 2025-02-14 | End: 2025-02-18

## 2025-02-14 RX ORDER — ONDANSETRON HCL/PF 4 MG/2 ML
4 VIAL (ML) INJECTION EVERY 6 HOURS
Refills: 0 | Status: DISCONTINUED | OUTPATIENT
Start: 2025-02-14 | End: 2025-02-14

## 2025-02-14 RX ORDER — IBUPROFEN 200 MG
600 TABLET ORAL EVERY 6 HOURS
Refills: 0 | Status: COMPLETED | OUTPATIENT
Start: 2025-02-14 | End: 2026-01-13

## 2025-02-14 RX ORDER — CITRIC ACID/SODIUM CITRATE 300-500 MG
30 SOLUTION, ORAL ORAL ONCE
Refills: 0 | Status: COMPLETED | OUTPATIENT
Start: 2025-02-14 | End: 2025-02-14

## 2025-02-14 RX ORDER — ALBUTEROL SULFATE 2.5 MG/3ML
2 VIAL, NEBULIZER (ML) INHALATION EVERY 6 HOURS
Refills: 0 | Status: DISCONTINUED | OUTPATIENT
Start: 2025-02-14 | End: 2025-02-18

## 2025-02-14 RX ORDER — SIMETHICONE 80 MG
80 TABLET,CHEWABLE ORAL EVERY 4 HOURS
Refills: 0 | Status: DISCONTINUED | OUTPATIENT
Start: 2025-02-14 | End: 2025-02-18

## 2025-02-14 RX ORDER — DIPHENHYDRAMINE HCL 12.5MG/5ML
25 ELIXIR ORAL EVERY 6 HOURS
Refills: 0 | Status: DISCONTINUED | OUTPATIENT
Start: 2025-02-14 | End: 2025-02-18

## 2025-02-14 RX ORDER — OXYCODONE HYDROCHLORIDE 30 MG/1
5 TABLET ORAL
Refills: 0 | Status: COMPLETED | OUTPATIENT
Start: 2025-02-14 | End: 2025-02-21

## 2025-02-14 RX ORDER — OXYCODONE HYDROCHLORIDE 30 MG/1
5 TABLET ORAL ONCE
Refills: 0 | Status: DISCONTINUED | OUTPATIENT
Start: 2025-02-14 | End: 2025-02-18

## 2025-02-14 RX ORDER — ACETAMINOPHEN 500 MG/5ML
1000 LIQUID (ML) ORAL ONCE
Refills: 0 | Status: DISCONTINUED | OUTPATIENT
Start: 2025-02-14 | End: 2025-02-18

## 2025-02-14 RX ORDER — SODIUM CHLORIDE 9 G/1000ML
1000 INJECTION, SOLUTION INTRAVENOUS
Refills: 0 | Status: DISCONTINUED | OUTPATIENT
Start: 2025-02-14 | End: 2025-02-18

## 2025-02-14 RX ORDER — SODIUM CHLORIDE 9 G/1000ML
1000 INJECTION, SOLUTION INTRAVENOUS
Refills: 0 | Status: DISCONTINUED | OUTPATIENT
Start: 2025-02-14 | End: 2025-02-14

## 2025-02-14 RX ORDER — MODIFIED LANOLIN 100 %
1 CREAM (GRAM) TOPICAL EVERY 6 HOURS
Refills: 0 | Status: DISCONTINUED | OUTPATIENT
Start: 2025-02-14 | End: 2025-02-18

## 2025-02-14 RX ORDER — ACETAMINOPHEN 500 MG/5ML
975 LIQUID (ML) ORAL
Refills: 0 | Status: DISCONTINUED | OUTPATIENT
Start: 2025-02-14 | End: 2025-02-18

## 2025-02-14 RX ORDER — CLOSTRIDIUM TETANI TOXOID ANTIGEN (FORMALDEHYDE INACTIVATED), CORYNEBACTERIUM DIPHTHERIAE TOXOID ANTIGEN (FORMALDEHYDE INACTIVATED), BORDETELLA PERTUSSIS TOXOID ANTIGEN (GLUTARALDEHYDE INACTIVATED), BORDETELLA PERTUSSIS FILAMENTOUS HEMAGGLUTININ ANTIGEN (FORMALDEHYDE INACTIVATED), BORDETELLA PERTUSSIS PERTACTIN ANTIGEN, AND BORDETELLA PERTUSSIS FIMBRIAE 2/3 ANTIGEN 5; 2; 2.5; 5; 3; 5 [LF]/.5ML; [LF]/.5ML; UG/.5ML; UG/.5ML; UG/.5ML; UG/.5ML
0.5 INJECTION, SUSPENSION INTRAMUSCULAR ONCE
Refills: 0 | Status: DISCONTINUED | OUTPATIENT
Start: 2025-02-14 | End: 2025-02-18

## 2025-02-14 RX ADMIN — Medication 30 MILLILITER(S): at 15:00

## 2025-02-14 RX ADMIN — Medication 1 APPLICATION(S): at 15:09

## 2025-02-14 RX ADMIN — Medication 975 MILLIGRAM(S): at 20:55

## 2025-02-14 RX ADMIN — KETOROLAC TROMETHAMINE 30 MILLIGRAM(S): 30 INJECTION, SOLUTION INTRAMUSCULAR; INTRAVENOUS at 18:16

## 2025-02-14 RX ADMIN — METOPROLOL SUCCINATE 12.5 MILLIGRAM(S): 50 TABLET, EXTENDED RELEASE ORAL at 20:57

## 2025-02-14 RX ADMIN — Medication 100 MILLIGRAM(S): at 15:40

## 2025-02-14 RX ADMIN — SODIUM CHLORIDE 200 MILLILITER(S): 9 INJECTION, SOLUTION INTRAVENOUS at 16:09

## 2025-02-14 RX ADMIN — Medication 20 MILLIGRAM(S): at 15:05

## 2025-02-14 RX ADMIN — DEXTROMETHORPHAN HBR, GUAIFENESIN 100 MILLIGRAM(S): 200 LIQUID ORAL at 18:50

## 2025-02-14 NOTE — OB RN PATIENT PROFILE - HBSAG: DATE, OB PROFILE
Please fax labs to the [facility] lab in the doctor's Office building  16-Jul-2024

## 2025-02-14 NOTE — PRE-ANESTHESIA EVALUATION ADULT - NSANTHPEFT_GEN_ALL_CORE
Gen: A&Ox4 in NAD with pleasant demeanor  CV: RRR S1/S2 intact  Resp: B/L breath sounds intact, breathing comfortably  Abd: Gravid

## 2025-02-14 NOTE — OB RN DELIVERY SUMMARY - NSSELHIDDEN_OBGYN_ALL_OB_FT
[NS_DeliveryAttending1_OBGYN_ALL_OB_FT:[id number]==],[NS_DeliveryAssist1_OBGYN_ALL_OB_FT:[id number]=],[NS_DeliveryRN_OBGYN_ALL_OB_FT:[id number]=],[NS_CirculateRN2_OBGYN_ALL_OB_FT:[id number]]

## 2025-02-14 NOTE — OB RN INTRAOPERATIVE NOTE - NSSELHIDDEN_OBGYN_ALL_OB_FT
[NS_DeliveryAttending1_OBGYN_ALL_OB_FT:[id number]==],[NS_DeliveryAssist1_OBGYN_ALL_OB_FT:[id number]=],[NS_DeliveryRN_OBGYN_ALL_OB_FT:[id number]=],[NS_CirculateRN2_OBGYN_ALL_OB_FT:[id number]]
- - -

## 2025-02-14 NOTE — OB PROVIDER DELIVERY SUMMARY - NSPROVIDERDELIVERYNOTE_OBGYN_ALL_OB_FT
38yo  s/p CS#2 and IUD insertion at 38w0d for cHTN   Ancef 2g  Pfannenstiel skin incision  Bladder flap developed  Low transverse uterine incision  Amniotomy for clear fluid   delivered in cephalic presentation without difficulty; two nuchal cords reduced at delivery  Apgars 9/9  Uterus closed in one layer with 0 chromic   Fascia closed with 1 vicryl  Subcutaneous closed in two layers with 2-0 plain  Skin closed with 4-0 monocryl  Excellent hemostasis  , IVF 1000,    No complications 38yo  s/p CS#2 and IUD insertion at 38w0d for cHTN   Ancef 2g  Pfannenstiel skin incision  Bladder flap developed  Low transverse uterine incision  Amniotomy for clear fluid   delivered in cephalic presentation without difficulty; two nuchal cords reduced at delivery  Apgars 9/9  Uterus closed in one layer with 0 chromic   Fascia closed with 1 vicryl  Subcutaneous closed in two layers with 2-0 plain  Skin closed with 4-0 monocryl  Bimanual exam revealed IUD strings palpable in vagina  Excellent hemostasis  , IVF 1000,    No complications    Mirena IUD LOT [id number]  EXP MAR 2027

## 2025-02-14 NOTE — OB PROVIDER H&P - HISTORY OF PRESENT ILLNESS
BARB LOPEZ is a 38yo  at 38w0d with hx of chronic HTN presenting for scheduled c/s. Denies ctx, VB, LOF. Endorses FM.   Seen in triage recently for headache and elevated /89.    Ante: Spontaneous conception. NIPT LR. Anatomy scan wnl. Passed GCT. First elevated BP on . On 2/10 24hr urine completed 94. GBS-. EFW 3200g    OBHx: G1 pCS 2020 c/b PEC w/ SF - IV magnesium  GYNHx: denies abnormal pap, STI, fibroids, ovarian cysts  PMHx: palpitations, asthma. Hx of cHTN when overweight, resolved after weight loss surgeyr  meds: PNV, metoprolol 12.5mg BID for palpitations, albuterol PRN (1x daily), iron, folate  PSH: C/S, l/s gastric sleeve, ventral hernia repair   allergies: NKDA    PE:  T(C): --  HR: --  BP: --  RR: --  SpO2: --  GEN: well-appearing, NAD  PULM: no increased WOB  ABD: soft, nontender, gravid  EXT: mild LE edema  TAUS: ***  ultrasound printed and attached to chart   SVE: ***    FHT: baseline ***, moderate variability, +accels, no decels  TOCO: ctx ***  reactive & reassuring     A&P: 38yo G* at * presents *. Fetal status reassuring.   - Admit to L&D  - Consents signed  - PN reviewed, GBS*  - NPO, IVF  - continuous tocometry, FHT   - IOL with ***    D/W    D/W   BARB LOPEZ is a 40yo  at 38w0d with hx of chronic HTN/gestational HTN presenting for scheduled c/s. Denies ctx, VB, LOF. Endorses FM.   Seen in triage recently for headache and elevated /89.  She desires postplacental mirena IUD insertion     Ante: Spontaneous conception. NIPT LR. Anatomy scan wnl. Passed GCT. First elevated BP on , seen in triage and discharged with BP monitoring and 24hr urine protein. 2/10 24hr urine 94. GBS-. EFW 3200g    OBHx: G1 pCS  for failed IOL for PEC w/o SF - cannot remember if severe or not. G2 current  GYNHx: denies abnormal pap, STI, fibroids, ovarian cysts  PMHx: palpitations (f/w Dr. Sheets, TTE only significant for mildly enlarged LV, for repeat TTE postpartum), asthma (uses albuterol daily- symptoms worse in pregnancy, previously only using 2x per month; follow with pulmonologist. Was hospitalized x1 as teenager but never intubated). Hx of cHTN on meds when overweight, resolved after weight loss surgery   meds: PNV, metoprolol 12.5mg BID for palpitations, albuterol PRN (1x daily), iron, folate  PSH: C/S, l/s gastric sleeve, ventral hernia repair   allergies: NKDA    PE:  /89, HR 89  GEN: well-appearing, NAD  PULM: no increased WOB  ABD: soft, nontender, gravid  EXT: mild LE edema  TAUS: cephalic, anterior placenta     FHT: baseline 120, moderate variability, +accels, no decels  TOCO: no ctx   reactive & reassuring     A&P:  40yo  at 38w0d with hx of chronic HTN/gestational HTN presenting for scheduled c/s and IUD insertion. Fetal status reassuring.   - Admit to L&D  - Consents signed  - PN reviewed, GBS-  - NPO, IVF  - continuous tocometry, FHT   - Ancef 2g  - proceed with C/S and IUD    BARB LOPEZ is a 38yo  at 38w0d with hx of chronic HTN presenting for scheduled c/s. Denies ctx, VB, LOF. Endorses FM.   Seen in triage recently for headache and elevated /89.  She desires postplacental mirena IUD insertion     Ante: Spontaneous conception. NIPT LR. Anatomy scan wnl. Passed GCT. First elevated BP on , seen in triage and discharged with BP monitoring and 24hr urine protein. 2/10 24hr urine 94. GBS-. EFW 3200g    OBHx: G1 pCS  for failed IOL for PEC w/o SF - cannot remember if severe or not. G2 current  GYNHx: denies abnormal pap, STI, fibroids, ovarian cysts  PMHx: palpitations (f/w Dr. Sheets, TTE only significant for mildly enlarged LV, for repeat TTE postpartum), asthma (uses albuterol daily- symptoms worse in pregnancy, previously only using 2x per month; follow with pulmonologist. Was hospitalized x1 as teenager but never intubated). Hx of cHTN on meds when overweight, resolved after weight loss surgery   meds: PNV, metoprolol 12.5mg BID for palpitations, albuterol PRN (1x daily), iron, folate  PSH: C/S, l/s gastric sleeve, ventral hernia repair   allergies: NKDA    PE:  /89, HR 89  GEN: well-appearing, NAD  PULM: no increased WOB  ABD: soft, nontender, gravid  EXT: mild LE edema  TAUS: cephalic, anterior placenta     FHT: baseline 120, moderate variability, +accels, no decels  TOCO: no ctx   reactive & reassuring     A&P:  38yo  at 38w0d with hx of chronic HTN not on antihypertensive therapy presenting for scheduled c/s and IUD insertion. Fetal status reassuring.   - Admit to L&D  - Consents signed  - PN reviewed, GBS-  - NPO, IVF  - continuous tocometry, FHT   - Ancef 2g  - proceed with C/S and IUD   - order albuterol and metoprolol for postpartum

## 2025-02-14 NOTE — OB RN PATIENT PROFILE - PRO HIV INFANT
"Chief Complaint  Pain (right lower leg redness ) and Diabetes    Subjective          Duane Wojciech Ramirez presents to [facility]  History of Present Illness  Abrasion of right lower extremity: Patient complains of abrasion of right lower extremity with pain and bleeding. Patient has tried cleaning the wound and dressing the wound every other day. Patient denies any fever.   Objective   Vital Signs:   /67 (BP Location: Right arm, Patient Position: Sitting, Cuff Size: Large Adult)   Pulse 80   Temp 97.9 °F (36.6 °C) (Temporal)   Ht 190.5 cm (75\")   Wt (!) 177 kg (390 lb 3.2 oz)   SpO2 95%   BMI 48.77 kg/m²     Physical Exam  Vitals and nursing note reviewed.   Cardiovascular:      Rate and Rhythm: Normal rate and regular rhythm.      Heart sounds: Normal heart sounds.   Pulmonary:      Effort: Pulmonary effort is normal.      Breath sounds: Normal breath sounds.   Skin:     Comments: See attached picture    Neurological:      Mental Status: He is oriented to person, place, and time.   Psychiatric:         Mood and Affect: Mood normal.            Result Review :            Assessment and Plan    Diagnoses and all orders for this visit:    1. Abrasion of right lower extremity, initial encounter (Primary)  Comments:  - Advised patient to keep wound clean with Dial soap and dress wound with nonadherent Telfa.   Orders:  -     sulfamethoxazole-trimethoprim (Bactrim DS) 800-160 MG per tablet; Take 1 tablet by mouth 2 (Two) Times a Day for 7 days.  Dispense: 14 tablet; Refill: 0      I spent 20 minutes caring for Duane on this date of service. This time includes time spent by me in the following activities:performing a medically appropriate examination and/or evaluation , counseling and educating the patient/family/caregiver, ordering medications, tests, or procedures and documenting information in the medical record  Follow Up   Return if symptoms worsen or fail to improve.  Patient " was given instructions and counseling regarding his condition or for health maintenance advice. Please see specific information pulled into the AVS if appropriate.        negative

## 2025-02-14 NOTE — OB PROVIDER DELIVERY SUMMARY - NSSELHIDDEN_OBGYN_ALL_OB_FT
[NS_DeliveryAttending1_OBGYN_ALL_OB_FT:[id number]],[NS_DeliveryAssist1_OBGYN_ALL_OB_FT:[id number]],[NS_DeliveryRN_OBGYN_ALL_OB_FT:[id number]],[NS_CirculateRN2_OBGYN_ALL_OB_FT:[id number]=]

## 2025-02-14 NOTE — OB PROVIDER H&P - NSLOWPPHRISK_OBGYN_A_OB
Diallo Pregnancy/Less than or equal to 4 previous vaginal births/No known bleeding disorder/No history of postpartum hemorrhage

## 2025-02-14 NOTE — OB RN PATIENT PROFILE - PROVIDER NOTIFICATION
Writer paged on call NP Kathy Gautam regarding patients lower blood pressures. Patient not reporting dizziness, although his mentation seems to be slightly confused from baseline. Orders received for 5% albumin once. Will implement and continue to monitor.    Declines

## 2025-02-15 LAB
BASOPHILS # BLD AUTO: 0.03 K/UL — SIGNIFICANT CHANGE UP (ref 0–0.2)
BASOPHILS NFR BLD AUTO: 0.3 % — SIGNIFICANT CHANGE UP (ref 0–2)
EOSINOPHIL # BLD AUTO: 0.02 K/UL — SIGNIFICANT CHANGE UP (ref 0–0.5)
EOSINOPHIL NFR BLD AUTO: 0.2 % — SIGNIFICANT CHANGE UP (ref 0–6)
HCT VFR BLD CALC: 33.3 % — LOW (ref 34.5–45)
HGB BLD-MCNC: 10.9 G/DL — LOW (ref 11.5–15.5)
IMM GRANULOCYTES NFR BLD AUTO: 1 % — HIGH (ref 0–0.9)
LYMPHOCYTES # BLD AUTO: 0.91 K/UL — LOW (ref 1–3.3)
LYMPHOCYTES # BLD AUTO: 8.5 % — LOW (ref 13–44)
MCHC RBC-ENTMCNC: 30.8 PG — SIGNIFICANT CHANGE UP (ref 27–34)
MCHC RBC-ENTMCNC: 32.7 G/DL — SIGNIFICANT CHANGE UP (ref 32–36)
MCV RBC AUTO: 94.1 FL — SIGNIFICANT CHANGE UP (ref 80–100)
MONOCYTES # BLD AUTO: 0.56 K/UL — SIGNIFICANT CHANGE UP (ref 0–0.9)
MONOCYTES NFR BLD AUTO: 5.2 % — SIGNIFICANT CHANGE UP (ref 2–14)
NEUTROPHILS # BLD AUTO: 9.04 K/UL — HIGH (ref 1.8–7.4)
NEUTROPHILS NFR BLD AUTO: 84.8 % — HIGH (ref 43–77)
NRBC BLD AUTO-RTO: 0 /100 WBCS — SIGNIFICANT CHANGE UP (ref 0–0)
PLATELET # BLD AUTO: 155 K/UL — SIGNIFICANT CHANGE UP (ref 150–400)
RBC # BLD: 3.54 M/UL — LOW (ref 3.8–5.2)
RBC # FLD: 14.7 % — HIGH (ref 10.3–14.5)
T PALLIDUM AB TITR SER: NEGATIVE — SIGNIFICANT CHANGE UP
WBC # BLD: 10.67 K/UL — HIGH (ref 3.8–10.5)
WBC # FLD AUTO: 10.67 K/UL — HIGH (ref 3.8–10.5)

## 2025-02-15 RX ORDER — IBUPROFEN 200 MG
600 TABLET ORAL EVERY 6 HOURS
Refills: 0 | Status: DISCONTINUED | OUTPATIENT
Start: 2025-02-15 | End: 2025-02-18

## 2025-02-15 RX ORDER — OXYCODONE HYDROCHLORIDE 30 MG/1
5 TABLET ORAL
Refills: 0 | Status: DISCONTINUED | OUTPATIENT
Start: 2025-02-15 | End: 2025-02-18

## 2025-02-15 RX ADMIN — METOPROLOL SUCCINATE 12.5 MILLIGRAM(S): 50 TABLET, EXTENDED RELEASE ORAL at 08:56

## 2025-02-15 RX ADMIN — Medication 975 MILLIGRAM(S): at 02:18

## 2025-02-15 RX ADMIN — KETOROLAC TROMETHAMINE 30 MILLIGRAM(S): 30 INJECTION, SOLUTION INTRAMUSCULAR; INTRAVENOUS at 00:47

## 2025-02-15 RX ADMIN — OXYCODONE HYDROCHLORIDE 5 MILLIGRAM(S): 30 TABLET ORAL at 14:00

## 2025-02-15 RX ADMIN — METOPROLOL SUCCINATE 12.5 MILLIGRAM(S): 50 TABLET, EXTENDED RELEASE ORAL at 21:45

## 2025-02-15 RX ADMIN — Medication 975 MILLIGRAM(S): at 08:53

## 2025-02-15 RX ADMIN — OXYCODONE HYDROCHLORIDE 5 MILLIGRAM(S): 30 TABLET ORAL at 13:05

## 2025-02-15 RX ADMIN — DEXTROMETHORPHAN HBR, GUAIFENESIN 100 MILLIGRAM(S): 200 LIQUID ORAL at 12:13

## 2025-02-15 RX ADMIN — KETOROLAC TROMETHAMINE 30 MILLIGRAM(S): 30 INJECTION, SOLUTION INTRAMUSCULAR; INTRAVENOUS at 06:03

## 2025-02-15 RX ADMIN — KETOROLAC TROMETHAMINE 30 MILLIGRAM(S): 30 INJECTION, SOLUTION INTRAMUSCULAR; INTRAVENOUS at 13:05

## 2025-02-15 RX ADMIN — Medication 600 MILLIGRAM(S): at 19:04

## 2025-02-15 RX ADMIN — Medication 975 MILLIGRAM(S): at 21:44

## 2025-02-15 RX ADMIN — Medication 975 MILLIGRAM(S): at 16:04

## 2025-02-15 RX ADMIN — Medication 600 MILLIGRAM(S): at 23:35

## 2025-02-15 RX ADMIN — Medication 975 MILLIGRAM(S): at 22:44

## 2025-02-15 RX ADMIN — Medication 975 MILLIGRAM(S): at 15:27

## 2025-02-15 RX ADMIN — KETOROLAC TROMETHAMINE 30 MILLIGRAM(S): 30 INJECTION, SOLUTION INTRAMUSCULAR; INTRAVENOUS at 12:12

## 2025-02-15 RX ADMIN — Medication 600 MILLIGRAM(S): at 20:00

## 2025-02-15 RX ADMIN — ENOXAPARIN SODIUM 40 MILLIGRAM(S): 100 INJECTION SUBCUTANEOUS at 06:02

## 2025-02-16 RX ORDER — NIFEDIPINE 30 MG
30 TABLET, EXTENDED RELEASE 24 HR ORAL EVERY 24 HOURS
Refills: 0 | Status: DISCONTINUED | OUTPATIENT
Start: 2025-02-16 | End: 2025-02-17

## 2025-02-16 RX ADMIN — Medication 30 MILLIGRAM(S): at 12:21

## 2025-02-16 RX ADMIN — DEXTROMETHORPHAN HBR, GUAIFENESIN 100 MILLIGRAM(S): 200 LIQUID ORAL at 20:46

## 2025-02-16 RX ADMIN — OXYCODONE HYDROCHLORIDE 5 MILLIGRAM(S): 30 TABLET ORAL at 07:15

## 2025-02-16 RX ADMIN — OXYCODONE HYDROCHLORIDE 5 MILLIGRAM(S): 30 TABLET ORAL at 07:53

## 2025-02-16 RX ADMIN — Medication 975 MILLIGRAM(S): at 21:00

## 2025-02-16 RX ADMIN — OXYCODONE HYDROCHLORIDE 5 MILLIGRAM(S): 30 TABLET ORAL at 00:00

## 2025-02-16 RX ADMIN — Medication 975 MILLIGRAM(S): at 06:11

## 2025-02-16 RX ADMIN — Medication 80 MILLIGRAM(S): at 01:32

## 2025-02-16 RX ADMIN — Medication 600 MILLIGRAM(S): at 18:16

## 2025-02-16 RX ADMIN — METOPROLOL SUCCINATE 12.5 MILLIGRAM(S): 50 TABLET, EXTENDED RELEASE ORAL at 18:16

## 2025-02-16 RX ADMIN — Medication 600 MILLIGRAM(S): at 06:55

## 2025-02-16 RX ADMIN — Medication 975 MILLIGRAM(S): at 14:22

## 2025-02-16 RX ADMIN — DEXTROMETHORPHAN HBR, GUAIFENESIN 100 MILLIGRAM(S): 200 LIQUID ORAL at 03:05

## 2025-02-16 RX ADMIN — Medication 600 MILLIGRAM(S): at 11:54

## 2025-02-16 RX ADMIN — METOPROLOL SUCCINATE 12.5 MILLIGRAM(S): 50 TABLET, EXTENDED RELEASE ORAL at 07:18

## 2025-02-16 RX ADMIN — ENOXAPARIN SODIUM 40 MILLIGRAM(S): 100 INJECTION SUBCUTANEOUS at 06:31

## 2025-02-16 RX ADMIN — OXYCODONE HYDROCHLORIDE 5 MILLIGRAM(S): 30 TABLET ORAL at 00:26

## 2025-02-16 RX ADMIN — OXYCODONE HYDROCHLORIDE 5 MILLIGRAM(S): 30 TABLET ORAL at 01:26

## 2025-02-16 RX ADMIN — Medication 975 MILLIGRAM(S): at 20:28

## 2025-02-16 RX ADMIN — Medication 600 MILLIGRAM(S): at 00:38

## 2025-02-16 RX ADMIN — Medication 600 MILLIGRAM(S): at 23:56

## 2025-02-16 RX ADMIN — OXYCODONE HYDROCHLORIDE 5 MILLIGRAM(S): 30 TABLET ORAL at 19:27

## 2025-02-16 RX ADMIN — Medication 975 MILLIGRAM(S): at 09:45

## 2025-02-16 RX ADMIN — Medication 975 MILLIGRAM(S): at 03:04

## 2025-02-16 RX ADMIN — OXYCODONE HYDROCHLORIDE 5 MILLIGRAM(S): 30 TABLET ORAL at 22:31

## 2025-02-16 RX ADMIN — Medication 600 MILLIGRAM(S): at 06:31

## 2025-02-16 RX ADMIN — Medication 600 MILLIGRAM(S): at 12:54

## 2025-02-16 RX ADMIN — OXYCODONE HYDROCHLORIDE 5 MILLIGRAM(S): 30 TABLET ORAL at 20:30

## 2025-02-16 NOTE — CHART NOTE - NSCHARTNOTEFT_GEN_A_CORE
Patient with 2x mild range BPs - starting nifedipine 30QD     Babs Edmond PGY1  Discussed with chief Lisa and Kathya

## 2025-02-17 RX ORDER — NIFEDIPINE 30 MG
60 TABLET, EXTENDED RELEASE 24 HR ORAL EVERY 24 HOURS
Refills: 0 | Status: DISCONTINUED | OUTPATIENT
Start: 2025-02-18 | End: 2025-02-18

## 2025-02-17 RX ORDER — NIFEDIPINE 30 MG
30 TABLET, EXTENDED RELEASE 24 HR ORAL ONCE
Refills: 0 | Status: COMPLETED | OUTPATIENT
Start: 2025-02-17 | End: 2025-02-17

## 2025-02-17 RX ADMIN — Medication 975 MILLIGRAM(S): at 10:20

## 2025-02-17 RX ADMIN — Medication 975 MILLIGRAM(S): at 16:15

## 2025-02-17 RX ADMIN — Medication 975 MILLIGRAM(S): at 15:20

## 2025-02-17 RX ADMIN — Medication 975 MILLIGRAM(S): at 20:44

## 2025-02-17 RX ADMIN — Medication 600 MILLIGRAM(S): at 18:40

## 2025-02-17 RX ADMIN — Medication 30 MILLIGRAM(S): at 12:55

## 2025-02-17 RX ADMIN — METOPROLOL SUCCINATE 12.5 MILLIGRAM(S): 50 TABLET, EXTENDED RELEASE ORAL at 18:40

## 2025-02-17 RX ADMIN — MAGNESIUM HYDROXIDE 30 MILLILITER(S): 400 SUSPENSION ORAL at 06:35

## 2025-02-17 RX ADMIN — Medication 975 MILLIGRAM(S): at 20:35

## 2025-02-17 RX ADMIN — OXYCODONE HYDROCHLORIDE 5 MILLIGRAM(S): 30 TABLET ORAL at 21:15

## 2025-02-17 RX ADMIN — Medication 600 MILLIGRAM(S): at 23:44

## 2025-02-17 RX ADMIN — OXYCODONE HYDROCHLORIDE 5 MILLIGRAM(S): 30 TABLET ORAL at 16:15

## 2025-02-17 RX ADMIN — OXYCODONE HYDROCHLORIDE 5 MILLIGRAM(S): 30 TABLET ORAL at 20:44

## 2025-02-17 RX ADMIN — DEXTROMETHORPHAN HBR, GUAIFENESIN 100 MILLIGRAM(S): 200 LIQUID ORAL at 23:44

## 2025-02-17 RX ADMIN — Medication 600 MILLIGRAM(S): at 13:30

## 2025-02-17 RX ADMIN — METOPROLOL SUCCINATE 12.5 MILLIGRAM(S): 50 TABLET, EXTENDED RELEASE ORAL at 06:24

## 2025-02-17 RX ADMIN — Medication 975 MILLIGRAM(S): at 11:05

## 2025-02-17 RX ADMIN — Medication 975 MILLIGRAM(S): at 02:46

## 2025-02-17 RX ADMIN — Medication 600 MILLIGRAM(S): at 06:24

## 2025-02-17 RX ADMIN — OXYCODONE HYDROCHLORIDE 5 MILLIGRAM(S): 30 TABLET ORAL at 23:49

## 2025-02-17 RX ADMIN — Medication 600 MILLIGRAM(S): at 19:30

## 2025-02-17 RX ADMIN — DEXTROMETHORPHAN HBR, GUAIFENESIN 100 MILLIGRAM(S): 200 LIQUID ORAL at 10:25

## 2025-02-17 RX ADMIN — ENOXAPARIN SODIUM 40 MILLIGRAM(S): 100 INJECTION SUBCUTANEOUS at 06:24

## 2025-02-17 RX ADMIN — OXYCODONE HYDROCHLORIDE 5 MILLIGRAM(S): 30 TABLET ORAL at 15:24

## 2025-02-17 RX ADMIN — Medication 30 MILLIGRAM(S): at 15:20

## 2025-02-17 RX ADMIN — Medication 600 MILLIGRAM(S): at 12:54

## 2025-02-18 ENCOUNTER — TRANSCRIPTION ENCOUNTER (OUTPATIENT)
Age: 40
End: 2025-02-18

## 2025-02-18 VITALS
SYSTOLIC BLOOD PRESSURE: 137 MMHG | HEART RATE: 84 BPM | OXYGEN SATURATION: 98 % | TEMPERATURE: 98 F | RESPIRATION RATE: 18 BRPM | DIASTOLIC BLOOD PRESSURE: 82 MMHG

## 2025-02-18 PROCEDURE — 86780 TREPONEMA PALLIDUM: CPT

## 2025-02-18 PROCEDURE — 86870 RBC ANTIBODY IDENTIFICATION: CPT

## 2025-02-18 PROCEDURE — 84156 ASSAY OF PROTEIN URINE: CPT

## 2025-02-18 PROCEDURE — 85025 COMPLETE CBC W/AUTO DIFF WBC: CPT

## 2025-02-18 PROCEDURE — 86901 BLOOD TYPING SEROLOGIC RH(D): CPT

## 2025-02-18 PROCEDURE — 83615 LACTATE (LD) (LDH) ENZYME: CPT

## 2025-02-18 PROCEDURE — 84550 ASSAY OF BLOOD/URIC ACID: CPT

## 2025-02-18 PROCEDURE — 86850 RBC ANTIBODY SCREEN: CPT

## 2025-02-18 PROCEDURE — 86900 BLOOD TYPING SEROLOGIC ABO: CPT

## 2025-02-18 PROCEDURE — 36415 COLL VENOUS BLD VENIPUNCTURE: CPT

## 2025-02-18 PROCEDURE — 59050 FETAL MONITOR W/REPORT: CPT

## 2025-02-18 PROCEDURE — 86880 COOMBS TEST DIRECT: CPT

## 2025-02-18 PROCEDURE — 82570 ASSAY OF URINE CREATININE: CPT

## 2025-02-18 PROCEDURE — 85384 FIBRINOGEN ACTIVITY: CPT

## 2025-02-18 PROCEDURE — 80053 COMPREHEN METABOLIC PANEL: CPT

## 2025-02-18 RX ORDER — ALBUTEROL SULFATE 2.5 MG/3ML
2 VIAL, NEBULIZER (ML) INHALATION
Qty: 0 | Refills: 0 | DISCHARGE
Start: 2025-02-18

## 2025-02-18 RX ORDER — NIFEDIPINE 30 MG
1 TABLET, EXTENDED RELEASE 24 HR ORAL
Qty: 60 | Refills: 0
Start: 2025-02-18 | End: 2025-04-18

## 2025-02-18 RX ADMIN — OXYCODONE HYDROCHLORIDE 5 MILLIGRAM(S): 30 TABLET ORAL at 14:10

## 2025-02-18 RX ADMIN — Medication 975 MILLIGRAM(S): at 02:57

## 2025-02-18 RX ADMIN — Medication 975 MILLIGRAM(S): at 09:20

## 2025-02-18 RX ADMIN — MAGNESIUM HYDROXIDE 30 MILLILITER(S): 400 SUSPENSION ORAL at 06:14

## 2025-02-18 RX ADMIN — Medication 600 MILLIGRAM(S): at 11:37

## 2025-02-18 RX ADMIN — ENOXAPARIN SODIUM 40 MILLIGRAM(S): 100 INJECTION SUBCUTANEOUS at 06:14

## 2025-02-18 RX ADMIN — Medication 600 MILLIGRAM(S): at 06:14

## 2025-02-18 RX ADMIN — Medication 600 MILLIGRAM(S): at 00:19

## 2025-02-18 RX ADMIN — OXYCODONE HYDROCHLORIDE 5 MILLIGRAM(S): 30 TABLET ORAL at 00:19

## 2025-02-18 RX ADMIN — OXYCODONE HYDROCHLORIDE 5 MILLIGRAM(S): 30 TABLET ORAL at 13:14

## 2025-02-18 RX ADMIN — METOPROLOL SUCCINATE 12.5 MILLIGRAM(S): 50 TABLET, EXTENDED RELEASE ORAL at 07:22

## 2025-02-18 NOTE — DISCHARGE NOTE OB - PATIENT PORTAL LINK FT
You can access the FollowMyHealth Patient Portal offered by [facility] by registering at the following website: [email or web address]. By joining [facility]’s FollowMyHealth portal, you will also be able to view your health information using other applications (apps) compatible with our system.

## 2025-02-18 NOTE — DISCHARGE NOTE OB - NS MD DC FALL RISK RISK
For information on Fall & Injury Prevention, visit: [email or web address] OR  [email or web address] OR  https://www.cdc.gov/steadi/patient.html

## 2025-02-18 NOTE — DISCHARGE NOTE OB - FINANCIAL ASSISTANCE
[facility] provides services at a reduced cost to those who are determined to be eligible through [facility]’s financial assistance program. Information regarding [facility]’s financial assistance program can be found by going to [email or web address] or by calling [phone].

## 2025-02-18 NOTE — DISCHARGE NOTE OB - PROVIDER TOKENS
FREE:[LAST:[Tj],FIRST:[Lashay],PHONE:[phone]],FAX:[(   )    -],ADDRESS:[[address]  [address] [place] [address]]]

## 2025-02-18 NOTE — DISCHARGE NOTE OB - CARE PLAN
1 Principal Discharge DX:	Postpartum state  Assessment and plan of treatment:	Please follow-up with your OB doctor within 1-2 weeks for an incision check and BP check.  Check bp 3x a day. If blood pressure greater than 160/110, you develop a headache not relieved by tylenol, visual disturbances, or right upper abdominal pain, call your doctor or the hospital, or go to your nearest emergency room. You can resume a regular diet at home and you should continue your prenatal vitamins as directed. You can take Tylenol 650mg every 6 hours for pain alternating with Motrin 600mg every 6 hours if needed.     Please place nothing in the vagina for 6 weeks (no tampons, no sex, no douching, no baths, no hot tubs, no swimming pools, etc). If you have severe headaches and/or vision changes, heavy bleeding, chest pain, or shortness of breath, please call your provider or go to the nearest ED. Call your OB with any signs of symptoms of infection including fever > 100.4 degrees, severe pain, malodorous vaginal discharge or heavy bleeding requiring more than 1-2 pads/hour.  Secondary Diagnosis:	Chronic hypertension with superimposed preeclampsia  Assessment and plan of treatment:	# Monitor blood pressure three times a day. Please document the blood pressure values to review with obstetrician at follow-up appointment.   # If your blood pressure is equal to or greater than 160/110 (either one) OR if you experience any of the following symptoms: changes in vision, headache not relieved with Tylenol, severe abdominal pain, vomiting, increased vaginal bleeding, chest pain or shortness of breath, please return to the hospital.  # If your blood pressure is equal to or greater than 150/100 (either one), please call your obstetrician.  # Hold parameters: If the blood pressure is lower than 110/60 (either one) skip that dose of medication and inform your doctor.  # Please schedule an appointment at your physician office in one week.  Secondary Diagnosis:	Asthma  Assessment and plan of treatment:	Albuterol as needed

## 2025-02-18 NOTE — PROGRESS NOTE ADULT - SUBJECTIVE AND OBJECTIVE BOX
Patient evaluated at bedside this morning, resting comfortable in bed. She reports pain is getting better. She is tolerating diet, passing flatus, and had BM. She is ambulating during the dya.  She reports pain is well controlled with oral pain medications.   She denies heavy vaginal bleeding. She denies headache, scotoma, chest pain, shortness of breath, RUQ/epigastric pain.  She has been ambulating without assistance, voiding spontaneously.    Physical Exam:  Vital Signs Last 24 Hrs  T(C): 36.9 (18 Feb 2025 06:00), Max: 37.2 (17 Feb 2025 18:12)  T(F): 98.5 (18 Feb 2025 06:00), Max: 98.9 (17 Feb 2025 18:12)  HR: 89 (18 Feb 2025 06:00) (84 - 93)  BP: 142/88 (18 Feb 2025 06:00) (121/79 - 148/82)  BP(mean): --  RR: 18 (18 Feb 2025 06:00) (18 - 18)  SpO2: 97% (18 Feb 2025 06:00) (97% - 98%)        GA: well-appearing, NAD  Pulm: no increased WOB  Abd: soft, nontender, no rebound or guarding, incision clean, dry and intact, uterus firm at midline,  fb below umbilicus  Extremities: no calf tenderness                   
Patient evaluated at bedside this morning, patient sleeping in bed. Upon awakening patient states that her pain is very bad but well controlled with oral pain medications.   She denies headache, dizziness, chest pain, palpitations, shortness of breath, nausea, vomiting or heavy vaginal bleeding.  She has been ambulating without assistance, voiding spontaneously, passing gas, tolerating regular diet.     Physical Exam:  Vital Signs Last 24 Hrs  T(C): 37.2 (16 Feb 2025 02:00), Max: 37.2 (15 Feb 2025 13:58)  T(F): 98.9 (16 Feb 2025 02:00), Max: 98.9 (15 Feb 2025 13:58)  HR: 90 (16 Feb 2025 02:00) (80 - 90)  BP: 132/79 (16 Feb 2025 02:00) (120/78 - 138/80)  BP(mean): --  RR: 18 (16 Feb 2025 02:00) (18 - 18)  SpO2: 97% (16 Feb 2025 02:00) (97% - 98%)    Parameters below as of 15 Feb 2025 13:58  Patient On (Oxygen Delivery Method): room air        GA: NAD, A+0 x 3  Pulm: no increased WOB  Abd: soft, nontender, nondistended, no rebound or guarding, incision clean, dry and intact, uterus firm at midline, 2 fb below umbilicus  Extremities: no swelling or calf tenderness                             10.9   10.67 )-----------( 155      ( 15 Feb 2025 09:07 )             33.3     02-14    137  |  106  |  12  ----------------------------<  86  3.7   |  23  |  0.45[L]    Ca    8.6      14 Feb 2025 14:55    TPro  6.3  /  Alb  3.3  /  TBili  0.3  /  DBili  x   /  AST  13  /  ALT  5[L]  /  AlkPhos  100  02-14        
Patient evaluated at bedside this morning, resting comfortable in bed, no acute events overnight. She reports pain is well-controlled.  She denies headache, dizziness, changes in vision, chest pain, palpitations, shortness of breath, RUQ pain, nausea, vomiting, fever, chills, heavy vaginal bleeding.  Miller in place. Tolerating water; has not yet tried to tolerate food. Has not yet ambulated. Denies flatus.      Physical Exam:  T(C): 36.9 (02-15-25 @ 02:02), Max: 36.9 (02-14-25 @ 22:36)  HR: 63 (02-15-25 @ 02:02) (63 - 84)  BP: 130/74 (02-15-25 @ 02:02) (119/63 - 138/83)  RR: 18 (02-15-25 @ 02:02) (16 - 19)  SpO2: 97% (02-15-25 @ 02:02) (97% - 100%)    Gen: no apparent distress  Pulm: no increased work of breathing  Abd: soft, nontender, nondistended, no rebound or guarding, uterus firm; incision clean dry intact  Extremities: trace pedal edema bilaterally, no calf tenderness bilaterally                          10.7   8.11  )-----------( 144      ( 14 Feb 2025 14:55 )             31.5     02-14    137  |  106  |  12  ----------------------------<  86  3.7   |  23  |  0.45[L]    Ca    8.6      14 Feb 2025 14:55    TPro  6.3  /  Alb  3.3  /  TBili  0.3  /  DBili  x   /  AST  13  /  ALT  5[L]  /  AlkPhos  100  02-14    acetaminophen     Tablet .. 975 milliGRAM(s) Oral <User Schedule>  acetaminophen   IVPB .. 1000 milliGRAM(s) IV Intermittent once  albuterol    90 MICROgram(s) HFA Inhaler 2 Puff(s) Inhalation every 6 hours PRN  diphenhydrAMINE 25 milliGRAM(s) Oral every 6 hours PRN  diphtheria/tetanus/pertussis (acellular) Vaccine (Adacel) 0.5 milliLiter(s) IntraMuscular once  enoxaparin Injectable 40 milliGRAM(s) SubCutaneous every 24 hours  guaiFENesin Oral Liquid (Sugar-Free) 100 milliGRAM(s) Oral every 12 hours PRN  ibuprofen  Tablet. 600 milliGRAM(s) Oral every 6 hours  ketorolac   Injectable 30 milliGRAM(s) IV Push every 6 hours  lactated ringers. 1000 milliLiter(s) IV Continuous <Continuous>  lanolin Ointment 1 Application(s) Topical every 6 hours PRN  magnesium hydroxide Suspension 30 milliLiter(s) Oral two times a day PRN  metoprolol succinate ER 12.5 milliGRAM(s) Oral every 12 hours  oxyCODONE    IR 5 milliGRAM(s) Oral every 3 hours PRN  oxyCODONE    IR 5 milliGRAM(s) Oral once PRN  oxytocin Infusion 42 milliUNIT(s)/Min IV Continuous <Continuous>  simethicone 80 milliGRAM(s) Chew every 4 hours PRN  
Patient evaluated at bedside this morning, resting comfortable in bed, no acute events overnight. She reports pain is well-controlled.  She denies headache, dizziness, changes in vision, chest pain, palpitations, shortness of breath, RUQ pain, nausea, vomiting, fever, chills, heavy vaginal bleeding.  She has been ambulating without assistance, voiding spontaneously, tolerating food, passing flatus      Physical Exam:  T(C): 36.7 (02-17-25 @ 02:00), Max: 37.2 (02-16-25 @ 22:00)  HR: 91 (02-17-25 @ 02:00) (88 - 91)  BP: 141/88 (02-17-25 @ 02:00) (129/82 - 141/88)  RR: 18 (02-17-25 @ 02:00) (16 - 18)  SpO2: 96% (02-17-25 @ 02:00) (96% - 98%)    Gen: no apparent distress  Pulm: no increased work of breathing  Abd: soft, nontender, nondistended, no rebound or guarding, uterus firm; incision clean dry intact  Extremities: trace pedal edema bilaterally, no calf tenderness bilaterally                          10.9   10.67 )-----------( 155      ( 15 Feb 2025 09:07 )             33.3           acetaminophen     Tablet .. 975 milliGRAM(s) Oral <User Schedule>  acetaminophen   IVPB .. 1000 milliGRAM(s) IV Intermittent once  albuterol    90 MICROgram(s) HFA Inhaler 2 Puff(s) Inhalation every 6 hours PRN  diphenhydrAMINE 25 milliGRAM(s) Oral every 6 hours PRN  diphtheria/tetanus/pertussis (acellular) Vaccine (Adacel) 0.5 milliLiter(s) IntraMuscular once  enoxaparin Injectable 40 milliGRAM(s) SubCutaneous every 24 hours  guaiFENesin Oral Liquid (Sugar-Free) 100 milliGRAM(s) Oral every 12 hours PRN  ibuprofen  Tablet. 600 milliGRAM(s) Oral every 6 hours  lactated ringers. 1000 milliLiter(s) IV Continuous <Continuous>  lanolin Ointment 1 Application(s) Topical every 6 hours PRN  magnesium hydroxide Suspension 30 milliLiter(s) Oral two times a day PRN  metoprolol succinate ER 12.5 milliGRAM(s) Oral every 12 hours  NIFEdipine XL 30 milliGRAM(s) Oral every 24 hours  oxyCODONE    IR 5 milliGRAM(s) Oral once PRN  oxyCODONE    IR 5 milliGRAM(s) Oral every 3 hours PRN  oxytocin Infusion 42 milliUNIT(s)/Min IV Continuous <Continuous>  simethicone 80 milliGRAM(s) Chew every 4 hours PRN

## 2025-02-18 NOTE — DISCHARGE NOTE OB - PLAN OF CARE
Albuterol as needed Please follow-up with your OB doctor within 1-2 weeks for an incision check and BP check.  Check bp 3x a day. If blood pressure greater than 160/110, you develop a headache not relieved by tylenol, visual disturbances, or right upper abdominal pain, call your doctor or the hospital, or go to your nearest emergency room. You can resume a regular diet at home and you should continue your prenatal vitamins as directed. You can take Tylenol 650mg every 6 hours for pain alternating with Motrin 600mg every 6 hours if needed.     Please place nothing in the vagina for 6 weeks (no tampons, no sex, no douching, no baths, no hot tubs, no swimming pools, etc). If you have severe headaches and/or vision changes, heavy bleeding, chest pain, or shortness of breath, please call your provider or go to the nearest ED. Call your OB with any signs of symptoms of infection including fever > 100.4 degrees, severe pain, malodorous vaginal discharge or heavy bleeding requiring more than 1-2 pads/hour. # Monitor blood pressure three times a day. Please document the blood pressure values to review with obstetrician at follow-up appointment.   # If your blood pressure is equal to or greater than 160/110 (either one) OR if you experience any of the following symptoms: changes in vision, headache not relieved with Tylenol, severe abdominal pain, vomiting, increased vaginal bleeding, chest pain or shortness of breath, please return to the hospital.  # If your blood pressure is equal to or greater than 150/100 (either one), please call your obstetrician.  # Hold parameters: If the blood pressure is lower than 110/60 (either one) skip that dose of medication and inform your doctor.  # Please schedule an appointment at your physician office in one week.

## 2025-02-18 NOTE — DISCHARGE NOTE OB - CARE PROVIDER_API CALL
[facility] [address]  [address] [place] [address]  Phone: [phone]  Fax: (   )    -  Follow Up Time:

## 2025-02-18 NOTE — DISCHARGE NOTE OB - MEDICATION SUMMARY - MEDICATIONS TO TAKE
I will START or STAY ON the medications listed below when I get home from the hospital:    Blood pressure cuff  -- take BP 3x/day  -- Indication: For Hypertension    ibuprofen 600 mg oral tablet  -- 1 tab(s) by mouth every 6 hours  -- Indication: For Pain    acetaminophen 325 mg oral tablet  -- 3 tab(s) by mouth every 6 hours  -- Indication: For Pain    metoprolol succinate 25 mg oral tablet, extended release  -- 1 tab(s) by mouth once a day (at bedtime)  -- Indication: For Palpitations    albuterol 90 mcg/inh inhalation aerosol  -- 2 puff(s) inhaled every 6 hours As needed Shortness of Breath and/or Wheezing  -- Indication: For Asthma    NIFEdipine 60 mg oral tablet, extended release  -- 1 tab(s) by mouth every 24 hours Do not take for BP <110/60 or heart rate >110  -- Indication: For Hypertension

## 2025-02-18 NOTE — PROGRESS NOTE ADULT - ASSESSMENT
A/P: 39y s/p  section, POD#3 w/ superimposed preeclampsia, stable  - siPEC: without severe features; continue nifedipine 30 QD; continue VSq4; denies toxic symptoms  - POD1 Hgb 10.9 (preop 10.7); vital signs stable; no acute intervention at this time  - Asthma: denies nighttime symptoms; continue albuterol PRN  - Palpitations: denies overnight; continue toprol 12.5 BID with hold parameters  - Pain: well controlled on analgesics as above  - GI: regular diet  - : robertson in place; will remove today and f/u TOV  - DVT prophylaxis: ambulating encouraged; SQ lovenox  - Dispo: PPD 3 or 4, unless otherwise specified
A/P: 39y s/p repeat  section at 38w for chronic HTN, POD#4, stable  -  Pain: PO motrin q6hrs, tylenol q8hrs, oxycodone for severe pain PRN  -  Post-operatively labs: post-op Hgb 10.9, hemodynamically stable, acute blood loss anemia, asymptomatic  - CV: hx of palpitations, continued on home metoprolol  -  GI: tolerating regular diet, passing gas  -  : s/p robertson , urinating without difficulty  -  DVT prophylaxis: encouraged increased ambulation, SCDs, SQL  -  Dispo: POD 4
A/P  39y s/p C/S #2, Mirena IUD placement at 38w for chronic HTN, POD# 1, stable, meeting postpartum milestones   - f/u POD1 CBC; preop Hgb 10.7, Plt 144  - cHTN: overnight normotensive; denies toxic symptoms; urine P/C 0.4 (prior 24 hour urine protein <300), suggestive of chronic HTN with superimposed preeclampsia without severe features  - Asthma: denies nighttime symptoms; continue albuterol PRN  - Palpitations: denies overnight; continue toprol 12.5 BID with hold parameters  - Pain: well controlled on analgesics as above  - GI: regular diet  - : robertson in place; will remove today and f/u TOV  - DVT prophylaxis: ambulating encouraged; SQ lovenox  - Dispo: PPD 2, unless otherwise specified    
A/P: 39y s/p  section, POD#2 w/ gHTN, stable  -  Pain: PO motrin q6hrs, tylenol q8hrs, oxycodone for severe pain PRN  -  Post-operatively labs: post-op Hgb 10.9, hemodynamically stable, no symptoms of anemia   -  GI: tolerating regular diet, passing gas  -  : s/p robertson , urinating without difficulty  -  DVT prophylaxis: encouraged increased ambulation, SCDs, SQL  -  Dispo: POD 3 or 4    gHTN  -no toxic complaints  -normotensive

## 2025-02-24 ENCOUNTER — NON-APPOINTMENT (OUTPATIENT)
Age: 40
End: 2025-02-24

## 2025-02-24 DIAGNOSIS — O34.219 MATERNAL CARE FOR UNSPECIFIED TYPE SCAR FROM PREVIOUS CESAREAN DELIVERY: ICD-10-CM

## 2025-02-24 DIAGNOSIS — Z3A.38 38 WEEKS GESTATION OF PREGNANCY: ICD-10-CM

## 2025-02-24 DIAGNOSIS — Z34.83 ENCOUNTER FOR SUPERVISION OF OTHER NORMAL PREGNANCY, THIRD TRIMESTER: ICD-10-CM

## 2025-02-24 DIAGNOSIS — J45.909 UNSPECIFIED ASTHMA, UNCOMPLICATED: ICD-10-CM

## 2025-03-04 ENCOUNTER — NON-APPOINTMENT (OUTPATIENT)
Age: 40
End: 2025-03-04

## 2025-03-06 ENCOUNTER — APPOINTMENT (OUTPATIENT)
Dept: OBGYN | Facility: CLINIC | Age: 40
End: 2025-03-06

## 2025-03-06 VITALS — OXYGEN SATURATION: 97 % | SYSTOLIC BLOOD PRESSURE: 120 MMHG | DIASTOLIC BLOOD PRESSURE: 70 MMHG | HEART RATE: 75 BPM

## 2025-03-06 PROCEDURE — 0503F POSTPARTUM CARE VISIT: CPT

## 2025-03-11 ENCOUNTER — NON-APPOINTMENT (OUTPATIENT)
Age: 40
End: 2025-03-11

## 2025-04-01 ENCOUNTER — APPOINTMENT (OUTPATIENT)
Dept: OBGYN | Facility: CLINIC | Age: 40
End: 2025-04-01

## 2025-04-02 ENCOUNTER — NON-APPOINTMENT (OUTPATIENT)
Age: 40
End: 2025-04-02

## 2025-04-11 ENCOUNTER — NON-APPOINTMENT (OUTPATIENT)
Age: 40
End: 2025-04-11

## 2025-04-19 ENCOUNTER — RX RENEWAL (OUTPATIENT)
Age: 40
End: 2025-04-19

## 2025-06-02 ENCOUNTER — APPOINTMENT (OUTPATIENT)
Age: 40
End: 2025-06-02
Payer: MEDICAID

## 2025-06-02 ENCOUNTER — NON-APPOINTMENT (OUTPATIENT)
Age: 40
End: 2025-06-02

## 2025-06-02 VITALS
BODY MASS INDEX: 30.62 KG/M2 | HEART RATE: 71 BPM | HEIGHT: 68 IN | SYSTOLIC BLOOD PRESSURE: 158 MMHG | WEIGHT: 202 LBS | OXYGEN SATURATION: 98 % | TEMPERATURE: 97.8 F | RESPIRATION RATE: 14 BRPM | DIASTOLIC BLOOD PRESSURE: 102 MMHG

## 2025-06-02 DIAGNOSIS — I10 ESSENTIAL (PRIMARY) HYPERTENSION: ICD-10-CM

## 2025-06-02 DIAGNOSIS — R07.89 OTHER CHEST PAIN: ICD-10-CM

## 2025-06-02 DIAGNOSIS — F41.0 PANIC DISORDER [EPISODIC PAROXYSMAL ANXIETY]: ICD-10-CM

## 2025-06-02 PROCEDURE — 99204 OFFICE O/P NEW MOD 45 MIN: CPT

## 2025-06-02 PROCEDURE — 93000 ELECTROCARDIOGRAM COMPLETE: CPT

## 2025-06-02 PROCEDURE — G2211 COMPLEX E/M VISIT ADD ON: CPT | Mod: NC

## 2025-06-02 RX ORDER — ESCITALOPRAM OXALATE 10 MG/1
10 TABLET ORAL DAILY
Qty: 30 | Refills: 1 | Status: ACTIVE | COMMUNITY
Start: 2025-06-02 | End: 1900-01-01

## 2025-06-02 RX ORDER — AMLODIPINE BESYLATE 5 MG/1
5 TABLET ORAL
Qty: 30 | Refills: 3 | Status: ACTIVE | COMMUNITY
Start: 2025-06-02 | End: 1900-01-01

## 2025-06-02 RX ORDER — LEVALBUTEROL TARTRATE 45 UG/1
45 AEROSOL, METERED ORAL
Qty: 1 | Refills: 3 | Status: ACTIVE | COMMUNITY
Start: 2025-06-02 | End: 1900-01-01

## 2025-06-03 LAB
ALBUMIN SERPL ELPH-MCNC: 4 G/DL
ALP BLD-CCNC: 75 U/L
ALT SERPL-CCNC: 8 U/L
ANION GAP SERPL CALC-SCNC: 13 MMOL/L
AST SERPL-CCNC: 18 U/L
BASOPHILS # BLD AUTO: 0.1 K/UL
BASOPHILS NFR BLD AUTO: 1.4 %
BILIRUB SERPL-MCNC: 0.2 MG/DL
BUN SERPL-MCNC: 11 MG/DL
CALCIUM SERPL-MCNC: 9.1 MG/DL
CHLORIDE SERPL-SCNC: 107 MMOL/L
CHOLEST SERPL-MCNC: 182 MG/DL
CO2 SERPL-SCNC: 20 MMOL/L
CREAT SERPL-MCNC: 0.65 MG/DL
EGFRCR SERPLBLD CKD-EPI 2021: 114 ML/MIN/1.73M2
EOSINOPHIL # BLD AUTO: 0.43 K/UL
EOSINOPHIL NFR BLD AUTO: 5.8 %
ESTIMATED AVERAGE GLUCOSE: 94 MG/DL
GLUCOSE SERPL-MCNC: 92 MG/DL
HBA1C MFR BLD HPLC: 4.9 %
HCT VFR BLD CALC: 38.3 %
HDLC SERPL-MCNC: 63 MG/DL
HGB BLD-MCNC: 12.3 G/DL
IMM GRANULOCYTES NFR BLD AUTO: 0.1 %
LDLC SERPL-MCNC: 103 MG/DL
LYMPHOCYTES # BLD AUTO: 1.32 K/UL
LYMPHOCYTES NFR BLD AUTO: 17.9 %
MAN DIFF?: NORMAL
MCHC RBC-ENTMCNC: 28.1 PG
MCHC RBC-ENTMCNC: 32.1 G/DL
MCV RBC AUTO: 87.4 FL
MONOCYTES # BLD AUTO: 0.63 K/UL
MONOCYTES NFR BLD AUTO: 8.5 %
NEUTROPHILS # BLD AUTO: 4.89 K/UL
NEUTROPHILS NFR BLD AUTO: 66.3 %
NONHDLC SERPL-MCNC: 119 MG/DL
PLATELET # BLD AUTO: 189 K/UL
POTASSIUM SERPL-SCNC: 4.5 MMOL/L
PROT SERPL-MCNC: 6.7 G/DL
RBC # BLD: 4.38 M/UL
RBC # FLD: 14.2 %
SODIUM SERPL-SCNC: 141 MMOL/L
T3 SERPL-MCNC: 112 NG/DL
T4 FREE SERPL-MCNC: 1.2 NG/DL
TRIGL SERPL-MCNC: 88 MG/DL
TSH SERPL-ACNC: 1.63 UIU/ML
WBC # FLD AUTO: 7.38 K/UL

## 2025-06-18 ENCOUNTER — APPOINTMENT (OUTPATIENT)
Age: 40
End: 2025-06-18
Payer: MEDICAID

## 2025-06-18 VITALS
HEIGHT: 68 IN | HEART RATE: 59 BPM | WEIGHT: 202 LBS | BODY MASS INDEX: 30.62 KG/M2 | TEMPERATURE: 98.9 F | RESPIRATION RATE: 14 BRPM | SYSTOLIC BLOOD PRESSURE: 125 MMHG | DIASTOLIC BLOOD PRESSURE: 78 MMHG | OXYGEN SATURATION: 98 %

## 2025-06-18 PROBLEM — E66.9 OBESITY: Status: ACTIVE | Noted: 2025-06-18

## 2025-06-18 PROCEDURE — 99215 OFFICE O/P EST HI 40 MIN: CPT

## 2025-07-14 ENCOUNTER — APPOINTMENT (OUTPATIENT)
Age: 40
End: 2025-07-14
Payer: MEDICAID

## 2025-07-14 VITALS
OXYGEN SATURATION: 99 % | BODY MASS INDEX: 29.92 KG/M2 | HEART RATE: 80 BPM | DIASTOLIC BLOOD PRESSURE: 82 MMHG | HEIGHT: 69 IN | TEMPERATURE: 97.5 F | RESPIRATION RATE: 14 BRPM | SYSTOLIC BLOOD PRESSURE: 144 MMHG | WEIGHT: 202 LBS

## 2025-07-14 PROCEDURE — 99213 OFFICE O/P EST LOW 20 MIN: CPT

## 2025-07-14 PROCEDURE — G2211 COMPLEX E/M VISIT ADD ON: CPT | Mod: NC

## 2025-07-23 ENCOUNTER — APPOINTMENT (OUTPATIENT)
Age: 40
End: 2025-07-23
Payer: MEDICAID

## 2025-07-23 PROCEDURE — 93306 TTE W/DOPPLER COMPLETE: CPT

## 2025-08-15 ENCOUNTER — RX RENEWAL (OUTPATIENT)
Age: 40
End: 2025-08-15

## 2025-08-15 RX ORDER — ESCITALOPRAM OXALATE 5 MG/1
5 TABLET ORAL DAILY
Qty: 90 | Refills: 1 | Status: ACTIVE | COMMUNITY
Start: 2025-08-15 | End: 1900-01-01